# Patient Record
Sex: FEMALE | Race: WHITE | URBAN - METROPOLITAN AREA
[De-identification: names, ages, dates, MRNs, and addresses within clinical notes are randomized per-mention and may not be internally consistent; named-entity substitution may affect disease eponyms.]

---

## 2018-07-11 ENCOUNTER — EMERGENCY (EMERGENCY)
Facility: HOSPITAL | Age: 33
LOS: 0 days | Discharge: HOME | End: 2018-07-11
Attending: EMERGENCY MEDICINE | Admitting: EMERGENCY MEDICINE
Payer: SELF-PAY

## 2018-07-11 VITALS
HEART RATE: 88 BPM | SYSTOLIC BLOOD PRESSURE: 141 MMHG | DIASTOLIC BLOOD PRESSURE: 89 MMHG | OXYGEN SATURATION: 100 % | RESPIRATION RATE: 18 BRPM | TEMPERATURE: 98 F

## 2018-07-11 VITALS — WEIGHT: 199.96 LBS

## 2018-07-11 DIAGNOSIS — M79.7 FIBROMYALGIA: ICD-10-CM

## 2018-07-11 DIAGNOSIS — R10.9 UNSPECIFIED ABDOMINAL PAIN: ICD-10-CM

## 2018-07-11 DIAGNOSIS — J45.909 UNSPECIFIED ASTHMA, UNCOMPLICATED: ICD-10-CM

## 2018-07-11 DIAGNOSIS — R10.12 LEFT UPPER QUADRANT PAIN: ICD-10-CM

## 2018-07-11 DIAGNOSIS — Z90.49 ACQUIRED ABSENCE OF OTHER SPECIFIED PARTS OF DIGESTIVE TRACT: ICD-10-CM

## 2018-07-11 DIAGNOSIS — Z90.49 ACQUIRED ABSENCE OF OTHER SPECIFIED PARTS OF DIGESTIVE TRACT: Chronic | ICD-10-CM

## 2018-07-11 DIAGNOSIS — R07.89 OTHER CHEST PAIN: ICD-10-CM

## 2018-07-11 DIAGNOSIS — R42 DIZZINESS AND GIDDINESS: ICD-10-CM

## 2018-07-11 DIAGNOSIS — M54.9 DORSALGIA, UNSPECIFIED: ICD-10-CM

## 2018-07-11 LAB
ALBUMIN SERPL ELPH-MCNC: 3.6 G/DL — SIGNIFICANT CHANGE UP (ref 3.3–5)
ALP SERPL-CCNC: 145 U/L — HIGH (ref 40–120)
ALT FLD-CCNC: 31 U/L — SIGNIFICANT CHANGE UP (ref 12–78)
ANION GAP SERPL CALC-SCNC: 9 MMOL/L — SIGNIFICANT CHANGE UP (ref 5–17)
APTT BLD: 29.5 SEC — SIGNIFICANT CHANGE UP (ref 27.5–37.4)
AST SERPL-CCNC: 24 U/L — SIGNIFICANT CHANGE UP (ref 15–37)
BASOPHILS # BLD AUTO: 0.01 K/UL — SIGNIFICANT CHANGE UP (ref 0–0.2)
BASOPHILS NFR BLD AUTO: 0.2 % — SIGNIFICANT CHANGE UP (ref 0–2)
BILIRUB SERPL-MCNC: 0.3 MG/DL — SIGNIFICANT CHANGE UP (ref 0.2–1.2)
BUN SERPL-MCNC: 13 MG/DL — SIGNIFICANT CHANGE UP (ref 7–23)
CALCIUM SERPL-MCNC: 9.2 MG/DL — SIGNIFICANT CHANGE UP (ref 8.5–10.1)
CHLORIDE SERPL-SCNC: 113 MMOL/L — HIGH (ref 96–108)
CO2 SERPL-SCNC: 20 MMOL/L — LOW (ref 22–31)
CREAT SERPL-MCNC: 0.6 MG/DL — SIGNIFICANT CHANGE UP (ref 0.5–1.3)
D DIMER BLD IA.RAPID-MCNC: 247 NG/ML DDU — HIGH
EOSINOPHIL # BLD AUTO: 0.1 K/UL — SIGNIFICANT CHANGE UP (ref 0–0.5)
EOSINOPHIL NFR BLD AUTO: 1.5 % — SIGNIFICANT CHANGE UP (ref 0–6)
GLUCOSE SERPL-MCNC: 85 MG/DL — SIGNIFICANT CHANGE UP (ref 70–99)
HCT VFR BLD CALC: 36.2 % — SIGNIFICANT CHANGE UP (ref 34.5–45)
HGB BLD-MCNC: 12.3 G/DL — SIGNIFICANT CHANGE UP (ref 11.5–15.5)
IMM GRANULOCYTES NFR BLD AUTO: 0.2 % — SIGNIFICANT CHANGE UP (ref 0–1.5)
INR BLD: 1.1 RATIO — SIGNIFICANT CHANGE UP (ref 0.88–1.16)
LIDOCAIN IGE QN: 65 U/L — LOW (ref 73–393)
LYMPHOCYTES # BLD AUTO: 1.61 K/UL — SIGNIFICANT CHANGE UP (ref 1–3.3)
LYMPHOCYTES # BLD AUTO: 24.9 % — SIGNIFICANT CHANGE UP (ref 13–44)
MCHC RBC-ENTMCNC: 28 PG — SIGNIFICANT CHANGE UP (ref 27–34)
MCHC RBC-ENTMCNC: 34 GM/DL — SIGNIFICANT CHANGE UP (ref 32–36)
MCV RBC AUTO: 82.5 FL — SIGNIFICANT CHANGE UP (ref 80–100)
MONOCYTES # BLD AUTO: 0.38 K/UL — SIGNIFICANT CHANGE UP (ref 0–0.9)
MONOCYTES NFR BLD AUTO: 5.9 % — SIGNIFICANT CHANGE UP (ref 2–14)
NEUTROPHILS # BLD AUTO: 4.36 K/UL — SIGNIFICANT CHANGE UP (ref 1.8–7.4)
NEUTROPHILS NFR BLD AUTO: 67.3 % — SIGNIFICANT CHANGE UP (ref 43–77)
NRBC # BLD: 0 /100 WBCS — SIGNIFICANT CHANGE UP (ref 0–0)
PLATELET # BLD AUTO: 280 K/UL — SIGNIFICANT CHANGE UP (ref 150–400)
POTASSIUM SERPL-MCNC: 3.8 MMOL/L — SIGNIFICANT CHANGE UP (ref 3.5–5.3)
POTASSIUM SERPL-SCNC: 3.8 MMOL/L — SIGNIFICANT CHANGE UP (ref 3.5–5.3)
PROT SERPL-MCNC: 7.2 GM/DL — SIGNIFICANT CHANGE UP (ref 6–8.3)
PROTHROM AB SERPL-ACNC: 11.9 SEC — SIGNIFICANT CHANGE UP (ref 9.8–12.7)
RBC # BLD: 4.39 M/UL — SIGNIFICANT CHANGE UP (ref 3.8–5.2)
RBC # FLD: 13.1 % — SIGNIFICANT CHANGE UP (ref 10.3–14.5)
SODIUM SERPL-SCNC: 142 MMOL/L — SIGNIFICANT CHANGE UP (ref 135–145)
WBC # BLD: 6.47 K/UL — SIGNIFICANT CHANGE UP (ref 3.8–10.5)
WBC # FLD AUTO: 6.47 K/UL — SIGNIFICANT CHANGE UP (ref 3.8–10.5)

## 2018-07-11 PROCEDURE — 99285 EMERGENCY DEPT VISIT HI MDM: CPT

## 2018-07-11 PROCEDURE — 71260 CT THORAX DX C+: CPT | Mod: 26

## 2018-07-11 RX ORDER — METHOCARBAMOL 500 MG/1
2 TABLET, FILM COATED ORAL
Qty: 18 | Refills: 0
Start: 2018-07-11 | End: 2018-07-13

## 2018-07-11 RX ORDER — OXYCODONE AND ACETAMINOPHEN 5; 325 MG/1; MG/1
1 TABLET ORAL ONCE
Qty: 0 | Refills: 0 | Status: DISCONTINUED | OUTPATIENT
Start: 2018-07-11 | End: 2018-07-11

## 2018-07-11 RX ORDER — SODIUM CHLORIDE 9 MG/ML
1000 INJECTION INTRAMUSCULAR; INTRAVENOUS; SUBCUTANEOUS ONCE
Qty: 0 | Refills: 0 | Status: COMPLETED | OUTPATIENT
Start: 2018-07-11 | End: 2018-07-11

## 2018-07-11 RX ADMIN — SODIUM CHLORIDE 1000 MILLILITER(S): 9 INJECTION INTRAMUSCULAR; INTRAVENOUS; SUBCUTANEOUS at 14:16

## 2018-07-11 RX ADMIN — OXYCODONE AND ACETAMINOPHEN 1 TABLET(S): 5; 325 TABLET ORAL at 16:38

## 2018-07-11 RX ADMIN — OXYCODONE AND ACETAMINOPHEN 1 TABLET(S): 5; 325 TABLET ORAL at 16:55

## 2018-07-11 NOTE — ED STATDOCS - OBJECTIVE STATEMENT
34 y/o female with a PMHx of fibromyalgia, seasonal allergies (on saline and Zyrtec), asthma (on albuterol), s/p cholecystectomy presents to the ED c/o sharp, constant abd pain that radiates to left flank pain for 2-3 days with N/D. Exacerbated when breathing deeply and palpation. +subjective fever. +dizziness. Family h/o blood clots. Denies vomiting, recent travel.  Pt seen at [facility] and sent to ED for CT scan to r/o PE. 34 y/o female with a PMHx of fibromyalgia, seasonal allergies (on saline and Zyrtec), asthma (on albuterol), s/p cholecystectomy presents to the ED c/o sharp, constant abd pain that radiates to left flank pain for 2-3 days with N/D. Exacerbated when breathing deeply and palpation. +subjective fever. +dizziness. +back pain. Family h/o blood clots. Denies vomiting, recent travel.  Pt seen at [facility] and sent to ED for CT scan to r/o PE.

## 2018-07-11 NOTE — ED ADULT TRIAGE NOTE - CHIEF COMPLAINT QUOTE
pt c/o abdominal and left falnk pain for  3 days with nausea and diarrhea, no vomitting, slight fever per pt. pt seen at [facility] and sent to ED for CT scan.

## 2018-07-11 NOTE — ED STATDOCS - PROGRESS NOTE DETAILS
32 yo F with PMHx fibromyalgia, asthma sent to ED from urgent care c/o sharp constant L sided chest and abdominal pain with radiation to back, worse when taking deep breaths and laying down x2-3 days associated with nausea, diarrhea, and occasional dizziness.  Denies calf pain, recent travel.    PE: + TTP LLQ, LUQ, L chest wall, lungs CTA b/l, heart RRR s1/s2, no calf tenderness b/l no LE edema  Plan: labs, d-dimer, CT A/P  Tiny Stewart PA-C 32 yo F with PMHx fibromyalgia, asthma sent to ED from urgent care c/o sharp constant L sided chest and abdominal pain with radiation to back, worse when taking deep breaths and laying down x2-3 days associated with nausea, diarrhea, and occasional dizziness.  Denies calf pain, recent travel.    PE: + TTP LLQ, LUQ, L chest wall, lungs CTA b/l, heart RRR s1/s2, no calf tenderness b/l no LE edema  Plan: labs, d-dimer, CT chest to r/o PE  Tiny Stewart PA-C CT chest negative for PE, likely pain related to pt's fibromyalgia, pt has appt with rheumatologist tomorrow.  Plan to d/c home with outpt f/u tmrw.  Pt agreeable to d/c and plan of care, all questions answered, return precautions given  Tiny Stewart PA-C

## 2018-07-11 NOTE — ED STATDOCS - GASTROINTESTINAL, MLM
abdomen soft, and non-distended. Bowel sounds present. +TTP LUQ and LLQ of abd and left chest wall abdomen soft, and non-distended. Bowel sounds present. +TTP LUQ and LLQ of abd

## 2018-07-11 NOTE — ED ADULT NURSE NOTE - CHIEF COMPLAINT QUOTE
pt c/o abdominal and left falnk pain for  3 days with nausea and diarrhea, no vomitting, slight fever per pt. pt seen at [facility] and sent to ED for CT scan.

## 2018-07-11 NOTE — ED STATDOCS - ATTENDING CONTRIBUTION TO CARE
I, John Brambila MD,  performed the initial face to face bedside interview with this patient regarding history of present illness, review of symptoms and relevant past medical, social and family history.  I completed an independent physical examination.  I was the initial provider who evaluated this patient. I have signed out the follow up of any pending tests (i.e. labs, radiological studies) to the ACP.  I have communicated the patient’s plan of care and disposition with the ACP.

## 2018-07-11 NOTE — ED STATDOCS - MUSCULOSKELETAL, MLM
range of motion is not limited and there is no muscle tenderness. range of motion is not limited. +TTP left chest wall

## 2018-07-11 NOTE — ED STATDOCS - CHPI ED SYMPTOM POS
NAUSEA/PAIN/+left flank pain, +dizziness/FEVER/DIARRHEA FEVER/NAUSEA/+left flank pain, +dizziness, +back pain/DIARRHEA/PAIN

## 2018-07-11 NOTE — ED STATDOCS - NS_ ATTENDINGSCRIBEDETAILS _ED_A_ED_FT
I, John Brambila MD,  performed the initial face to face bedside interview with this patient regarding history of present illness, review of symptoms and relevant past medical, social and family history.  I completed an independent physical examination.    The history, relevant review of systems, past medical and surgical history, medical decision making, and physical examination was documented by the scribe in my presence and I attest to the accuracy of the documentation.

## 2020-01-14 NOTE — ED ADULT NURSE NOTE - INTEGUMENTARY WDL
Addended by: JESUS WHITEHEAD on: 1/14/2020 08:03 AM     Modules accepted: Orders    
Color consistent with ethnicity/race, warm, dry intact, resilient.

## 2023-06-28 ENCOUNTER — EMERGENCY (EMERGENCY)
Facility: HOSPITAL | Age: 38
LOS: 0 days | Discharge: HOME | End: 2023-06-28
Attending: EMERGENCY MEDICINE
Payer: MEDICAID

## 2023-06-28 VITALS — HEIGHT: 64 IN | WEIGHT: 203.93 LBS

## 2023-06-28 VITALS
TEMPERATURE: 98 F | HEART RATE: 97 BPM | SYSTOLIC BLOOD PRESSURE: 120 MMHG | DIASTOLIC BLOOD PRESSURE: 60 MMHG | RESPIRATION RATE: 19 BRPM | OXYGEN SATURATION: 99 %

## 2023-06-28 DIAGNOSIS — N83.202 UNSPECIFIED OVARIAN CYST, LEFT SIDE: ICD-10-CM

## 2023-06-28 DIAGNOSIS — Z91.013 ALLERGY TO SEAFOOD: ICD-10-CM

## 2023-06-28 DIAGNOSIS — R68.2 DRY MOUTH, UNSPECIFIED: ICD-10-CM

## 2023-06-28 DIAGNOSIS — Z90.49 ACQUIRED ABSENCE OF OTHER SPECIFIED PARTS OF DIGESTIVE TRACT: Chronic | ICD-10-CM

## 2023-06-28 DIAGNOSIS — R11.2 NAUSEA WITH VOMITING, UNSPECIFIED: ICD-10-CM

## 2023-06-28 DIAGNOSIS — R11.15 CYCLICAL VOMITING SYNDROME UNRELATED TO MIGRAINE: ICD-10-CM

## 2023-06-28 DIAGNOSIS — Z88.0 ALLERGY STATUS TO PENICILLIN: ICD-10-CM

## 2023-06-28 DIAGNOSIS — Z91.041 RADIOGRAPHIC DYE ALLERGY STATUS: ICD-10-CM

## 2023-06-28 DIAGNOSIS — N83.201 UNSPECIFIED OVARIAN CYST, RIGHT SIDE: ICD-10-CM

## 2023-06-28 DIAGNOSIS — R63.4 ABNORMAL WEIGHT LOSS: ICD-10-CM

## 2023-06-28 PROBLEM — M79.7 FIBROMYALGIA: Chronic | Status: ACTIVE | Noted: 2018-07-22

## 2023-06-28 PROBLEM — J45.909 UNSPECIFIED ASTHMA, UNCOMPLICATED: Chronic | Status: ACTIVE | Noted: 2018-07-22

## 2023-06-28 LAB
ALBUMIN SERPL ELPH-MCNC: 3.4 G/DL — SIGNIFICANT CHANGE UP (ref 3.3–5)
ALP SERPL-CCNC: 124 U/L — HIGH (ref 40–120)
ALT FLD-CCNC: 50 U/L — SIGNIFICANT CHANGE UP (ref 12–78)
ANION GAP SERPL CALC-SCNC: 9 MMOL/L — SIGNIFICANT CHANGE UP (ref 5–17)
AST SERPL-CCNC: 55 U/L — HIGH (ref 15–37)
BASOPHILS # BLD AUTO: 0.02 K/UL — SIGNIFICANT CHANGE UP (ref 0–0.2)
BASOPHILS NFR BLD AUTO: 0.3 % — SIGNIFICANT CHANGE UP (ref 0–2)
BILIRUB SERPL-MCNC: 1 MG/DL — SIGNIFICANT CHANGE UP (ref 0.2–1.2)
BUN SERPL-MCNC: 9 MG/DL — SIGNIFICANT CHANGE UP (ref 7–23)
CALCIUM SERPL-MCNC: 9.5 MG/DL — SIGNIFICANT CHANGE UP (ref 8.5–10.1)
CHLORIDE SERPL-SCNC: 108 MMOL/L — SIGNIFICANT CHANGE UP (ref 96–108)
CO2 SERPL-SCNC: 24 MMOL/L — SIGNIFICANT CHANGE UP (ref 22–31)
CREAT SERPL-MCNC: 0.36 MG/DL — LOW (ref 0.5–1.3)
EGFR: 135 ML/MIN/1.73M2 — SIGNIFICANT CHANGE UP
EOSINOPHIL # BLD AUTO: 0.02 K/UL — SIGNIFICANT CHANGE UP (ref 0–0.5)
EOSINOPHIL NFR BLD AUTO: 0.3 % — SIGNIFICANT CHANGE UP (ref 0–6)
GLUCOSE SERPL-MCNC: 81 MG/DL — SIGNIFICANT CHANGE UP (ref 70–99)
HCT VFR BLD CALC: 38.3 % — SIGNIFICANT CHANGE UP (ref 34.5–45)
HGB BLD-MCNC: 13.1 G/DL — SIGNIFICANT CHANGE UP (ref 11.5–15.5)
IMM GRANULOCYTES NFR BLD AUTO: 0 % — SIGNIFICANT CHANGE UP (ref 0–0.9)
LACTATE SERPL-SCNC: 1.5 MMOL/L — SIGNIFICANT CHANGE UP (ref 0.7–2)
LIDOCAIN IGE QN: 125 U/L — SIGNIFICANT CHANGE UP (ref 73–393)
LYMPHOCYTES # BLD AUTO: 1.48 K/UL — SIGNIFICANT CHANGE UP (ref 1–3.3)
LYMPHOCYTES # BLD AUTO: 25.4 % — SIGNIFICANT CHANGE UP (ref 13–44)
MCHC RBC-ENTMCNC: 27.2 PG — SIGNIFICANT CHANGE UP (ref 27–34)
MCHC RBC-ENTMCNC: 34.2 GM/DL — SIGNIFICANT CHANGE UP (ref 32–36)
MCV RBC AUTO: 79.6 FL — LOW (ref 80–100)
MONOCYTES # BLD AUTO: 0.45 K/UL — SIGNIFICANT CHANGE UP (ref 0–0.9)
MONOCYTES NFR BLD AUTO: 7.7 % — SIGNIFICANT CHANGE UP (ref 2–14)
NEUTROPHILS # BLD AUTO: 3.85 K/UL — SIGNIFICANT CHANGE UP (ref 1.8–7.4)
NEUTROPHILS NFR BLD AUTO: 66.3 % — SIGNIFICANT CHANGE UP (ref 43–77)
PLATELET # BLD AUTO: 209 K/UL — SIGNIFICANT CHANGE UP (ref 150–400)
POTASSIUM SERPL-MCNC: 4.1 MMOL/L — SIGNIFICANT CHANGE UP (ref 3.5–5.3)
POTASSIUM SERPL-SCNC: 4.1 MMOL/L — SIGNIFICANT CHANGE UP (ref 3.5–5.3)
PROT SERPL-MCNC: 7.1 GM/DL — SIGNIFICANT CHANGE UP (ref 6–8.3)
RBC # BLD: 4.81 M/UL — SIGNIFICANT CHANGE UP (ref 3.8–5.2)
RBC # FLD: 13.4 % — SIGNIFICANT CHANGE UP (ref 10.3–14.5)
SODIUM SERPL-SCNC: 141 MMOL/L — SIGNIFICANT CHANGE UP (ref 135–145)
WBC # BLD: 5.82 K/UL — SIGNIFICANT CHANGE UP (ref 3.8–10.5)
WBC # FLD AUTO: 5.82 K/UL — SIGNIFICANT CHANGE UP (ref 3.8–10.5)

## 2023-06-28 PROCEDURE — 36415 COLL VENOUS BLD VENIPUNCTURE: CPT

## 2023-06-28 PROCEDURE — 74177 CT ABD & PELVIS W/CONTRAST: CPT | Mod: 26,MA

## 2023-06-28 PROCEDURE — 80053 COMPREHEN METABOLIC PANEL: CPT

## 2023-06-28 PROCEDURE — 96374 THER/PROPH/DIAG INJ IV PUSH: CPT | Mod: XU

## 2023-06-28 PROCEDURE — 99284 EMERGENCY DEPT VISIT MOD MDM: CPT | Mod: 25

## 2023-06-28 PROCEDURE — 74018 RADEX ABDOMEN 1 VIEW: CPT | Mod: 26

## 2023-06-28 PROCEDURE — 74177 CT ABD & PELVIS W/CONTRAST: CPT | Mod: MA

## 2023-06-28 PROCEDURE — 83605 ASSAY OF LACTIC ACID: CPT

## 2023-06-28 PROCEDURE — 74018 RADEX ABDOMEN 1 VIEW: CPT

## 2023-06-28 PROCEDURE — 85025 COMPLETE CBC W/AUTO DIFF WBC: CPT

## 2023-06-28 PROCEDURE — 83690 ASSAY OF LIPASE: CPT

## 2023-06-28 PROCEDURE — 99285 EMERGENCY DEPT VISIT HI MDM: CPT

## 2023-06-28 RX ORDER — ONDANSETRON 8 MG/1
4 TABLET, FILM COATED ORAL ONCE
Refills: 0 | Status: COMPLETED | OUTPATIENT
Start: 2023-06-28 | End: 2023-06-28

## 2023-06-28 RX ORDER — SODIUM CHLORIDE 9 MG/ML
2900 INJECTION INTRAMUSCULAR; INTRAVENOUS; SUBCUTANEOUS ONCE
Refills: 0 | Status: COMPLETED | OUTPATIENT
Start: 2023-06-28 | End: 2023-06-28

## 2023-06-28 RX ADMIN — ONDANSETRON 4 MILLIGRAM(S): 8 TABLET, FILM COATED ORAL at 13:43

## 2023-06-28 RX ADMIN — SODIUM CHLORIDE 2900 MILLILITER(S): 9 INJECTION INTRAMUSCULAR; INTRAVENOUS; SUBCUTANEOUS at 13:43

## 2023-06-28 NOTE — ED STATDOCS - OBJECTIVE STATEMENT
39 y/o female p/w vomiting x1 month. Pt states the vomiting has become more frequent the last week or so, not associated with eating, doesn't always feel nauseous, denies diarrhea, fevers. Pt had an 80 lb weight loss the last seven months. Allergic to shellfish and vomits with penicillin. No urinary symptoms.

## 2023-06-28 NOTE — ED STATDOCS - NSFOLLOWUPINSTRUCTIONS_ED_ALL_ED_FT
Follow up with your pmd within 48 hours- show copies of ER results   Follow up with gastroenterologist for further testing   Return to the ED if any worsening or persistent symptoms       Nausea and Vomiting, Adult  Nausea is the feeling that you have an upset stomach or that you are about to vomit. As nausea gets worse, it can lead to vomiting. Vomiting is when stomach contents forcefully come out of your mouth as a result of nausea. Vomiting can make you feel weak and cause you to become dehydrated.    Dehydration can make you feel tired and thirsty, cause you to have a dry mouth, and decrease how often you urinate. Older adults and people with other diseases or a weak disease-fighting system (immune system) are at higher risk for dehydration. It is important to treat your nausea and vomiting as told by your health care provider.    Follow these instructions at home:  Watch your symptoms for any changes. Tell your health care provider about them.    Eating and drinking    A bottle of clear fruit juice and glass of water.  A sign showing that a person should not drink alcohol.  Take an oral rehydration solution (ORS). This is a drink that is sold at pharmacies and retail stores.  Drink clear fluids slowly and in small amounts as you are able. Clear fluids include water, ice chips, low-calorie sports drinks, and fruit juice that has water added (diluted fruit juice).  Eat bland, easy-to-digest foods in small amounts as you are able. These foods include bananas, applesauce, rice, lean meats, toast, and crackers.  Avoid fluids that contain a lot of sugar or caffeine, such as energy drinks, sports drinks, and soda.  Avoid alcohol.  Avoid spicy or fatty foods.  General instructions    Take over-the-counter and prescription medicines only as told by your health care provider.  Drink enough fluid to keep your urine pale yellow.  Wash your hands often using soap and water for at least 20 seconds. If soap and water are not available, use hand .  Make sure that everyone in your household washes their hands well and often.  Rest at home while you recover.  Watch your condition for any changes.  Take slow and deep breaths when you feel nauseous.  Keep all follow-up visits. This is important.  Contact a health care provider if:  Your symptoms get worse.  You have new symptoms.  You have a fever.  You cannot drink fluids without vomiting.  Your nausea does not go away after 2 days.  You feel light-headed or dizzy.  You have a headache.  You have muscle cramps.  You have a rash.  You have pain while urinating.  Get help right away if:  You have pain in your chest, neck, arm, or jaw.  You feel extremely weak or you faint.  You have persistent vomiting.  You have vomit that is bright red or looks like black coffee grounds.  You have bloody or black stools (feces) or stools that look like tar.  You have a severe headache, a stiff neck, or both.  You have severe pain, cramping, or bloating in your abdomen.  You have difficulty breathing, or you are breathing very quickly.  Your heart is beating very quickly.  Your skin feels cold and clammy.  You feel confused.  You have signs of dehydration, such as:  Dark urine, very little urine, or no urine.  Cracked lips.  Dry mouth.  Sunken eyes.  Sleepiness.  Weakness.  These symptoms may be an emergency. Get help right away. Call 911.  Do not wait to see if the symptoms will go away.  Do not drive yourself to the hospital.  Summary  Nausea is the feeling that you have an upset stomach or that you are about to vomit. As nausea gets worse, it can lead to vomiting. Vomiting can make you feel weak and cause you to become dehydrated.  Follow instructions from your health care provider about eating and drinking to prevent dehydration.  Take over-the-counter and prescription medicines only as told by your health care provider.  Contact your health care provider if your symptoms get worse, or you have new symptoms.  Keep all follow-up visits. This is important.  This information is not intended to replace advice given to you by your health care provider. Make sure you discuss any questions you have with your health care provider.    Document Revised: 06/24/2022 Document Reviewed: 06/24/2022

## 2023-06-28 NOTE — ED ADULT TRIAGE NOTE - CHIEF COMPLAINT QUOTE
lower back pain, vomiting and nausea x 1 month. Pt was seen by PCP and sent in for further evaluation.

## 2023-06-28 NOTE — ED ADULT NURSE NOTE - NSFALLOOBATTEMPT_ED_ALL_ED
PATIENT D/C AT THIS TIME. PATIENT VERBALIZES UNDERSTANDING OF D/C
INSTRUCTIONS. PICC LINE REMOVED BIO-PATCH PLACE AND TEGADERM AND PATIENT
ADVISED TO LEAVE ON UNTIL 3/18/21 THEN REMOVE AND CLEAN WITH SOAP AND WATER.
TELE REMOVED AND ED NOTIFIED AT THIS TIME. No

## 2023-06-28 NOTE — ED STATDOCS - CLINICAL SUMMARY MEDICAL DECISION MAKING FREE TEXT BOX
37 y/o female w/ weight loss over the last 7 months and now w/ cyclic vomiting for a month. Unsure of etiology, will check labs, urine, possible CT scan. Pt w/ anaphylactic reaction to shellfish, will discuss w/ radiology whether IV is indicated.

## 2023-06-28 NOTE — ED STATDOCS - PATIENT PORTAL LINK FT
You can access the FollowMyHealth Patient Portal offered by [facility] by registering at the following website: [email or web address]. By joining [facility]’s FollowMyHealth portal, you will also be able to view your health information using other applications (apps) compatible with our system.

## 2023-06-28 NOTE — ED STATDOCS - PROGRESS NOTE DETAILS
ALICE Pascual: labs, ct reviewed. pt feeling better. will finish IVF and then po challenge. stacey Pascual: I participated in the care of this patient. I agree with the history, physical and plan. ALICE Pascual: IVF finished, pt feeling better. Pt refusing UA. Pt tolerated po. pt stable for dc and to follow up with GI outpt.

## 2023-06-28 NOTE — ED STATDOCS - ATTENDING APP SHARED VISIT CONTRIBUTION OF CARE
I personally saw the patient with the LISA, and completed the key components of the history and physical exam. I then discussed the management plan with the LISA.

## 2023-06-28 NOTE — ED ADULT NURSE NOTE - NSFALLUNIVINTERV_ED_ALL_ED
Bed/Stretcher in lowest position, wheels locked, appropriate side rails in place/Call bell, personal items and telephone in reach/Instruct patient to call for assistance before getting out of bed/chair/stretcher/Non-slip footwear applied when patient is off stretcher/[place] to call system/Physically safe environment - no spills, clutter or unnecessary equipment/Purposeful proactive rounding/Room/bathroom lighting operational, light cord in reach

## 2023-07-14 ENCOUNTER — APPOINTMENT (OUTPATIENT)
Dept: GASTROENTEROLOGY | Facility: CLINIC | Age: 38
End: 2023-07-14
Payer: MEDICAID

## 2023-07-14 VITALS
HEIGHT: 64 IN | BODY MASS INDEX: 34.31 KG/M2 | WEIGHT: 201 LBS | HEART RATE: 128 BPM | DIASTOLIC BLOOD PRESSURE: 94 MMHG | SYSTOLIC BLOOD PRESSURE: 125 MMHG

## 2023-07-14 PROCEDURE — 99203 OFFICE O/P NEW LOW 30 MIN: CPT

## 2023-07-14 RX ORDER — ONDANSETRON HYDROCHLORIDE 4 MG/1
4 TABLET, FILM COATED ORAL
Refills: 0 | Status: ACTIVE | COMMUNITY

## 2023-07-14 NOTE — PHYSICAL EXAM

## 2023-07-14 NOTE — ASSESSMENT
[FreeTextEntry1] : 36 yo female with history of nausea and vomiting.  Etiology is not clear.  Plan to obtain upper GI series to look for evidence of gastric emptying issues.  We will change medication to pantoprazole 40 mg daily.  Patient to use as needed Zofran.  Patient advised to use small frequent meals.  Follow up three weeks.

## 2023-07-14 NOTE — HISTORY OF PRESENT ILLNESS
[FreeTextEntry1] : Ms. ETIENNE NEWTON is a 38 year old female with history of GERD symptoms in the past with new onset of nausea and vomiting for the last month.  Patient has noted some weight loss associated.  Patient does note increasing GERD symptoms despite taking omeprazole.  Patient went to the emergency room for evaluation where laboratory tests and a CAT scan were essentially unremarkable.  Patient was noted to have bilateral ovarian cysts which have been longstanding.  Patient has been taking Zofran with some improvement.\par

## 2023-07-19 ENCOUNTER — OUTPATIENT (OUTPATIENT)
Dept: OUTPATIENT SERVICES | Facility: HOSPITAL | Age: 38
LOS: 1 days | End: 2023-07-19
Payer: MEDICAID

## 2023-07-19 ENCOUNTER — RESULT REVIEW (OUTPATIENT)
Age: 38
End: 2023-07-19

## 2023-07-19 DIAGNOSIS — Z90.49 ACQUIRED ABSENCE OF OTHER SPECIFIED PARTS OF DIGESTIVE TRACT: Chronic | ICD-10-CM

## 2023-07-19 DIAGNOSIS — R11.10 VOMITING, UNSPECIFIED: ICD-10-CM

## 2023-07-19 PROCEDURE — 74240 X-RAY XM UPR GI TRC 1CNTRST: CPT | Mod: 26

## 2023-07-19 PROCEDURE — 74240 X-RAY XM UPR GI TRC 1CNTRST: CPT

## 2023-07-20 DIAGNOSIS — R11.10 VOMITING, UNSPECIFIED: ICD-10-CM

## 2023-07-25 RX ORDER — ONDANSETRON 4 MG/1
4 TABLET, ORALLY DISINTEGRATING ORAL
Qty: 90 | Refills: 12 | Status: DISCONTINUED | COMMUNITY
Start: 2023-07-14 | End: 2023-07-25

## 2023-08-01 ENCOUNTER — APPOINTMENT (OUTPATIENT)
Dept: GASTROENTEROLOGY | Facility: CLINIC | Age: 38
End: 2023-08-01
Payer: MEDICAID

## 2023-08-01 VITALS — WEIGHT: 200 LBS | HEIGHT: 64 IN | BODY MASS INDEX: 34.15 KG/M2

## 2023-08-01 DIAGNOSIS — Z09 ENCOUNTER FOR FOLLOW-UP EXAMINATION AFTER COMPLETED TREATMENT FOR CONDITIONS OTHER THAN MALIGNANT NEOPLASM: ICD-10-CM

## 2023-08-01 DIAGNOSIS — R13.10 DYSPHAGIA, UNSPECIFIED: ICD-10-CM

## 2023-08-01 DIAGNOSIS — K30 FUNCTIONAL DYSPEPSIA: ICD-10-CM

## 2023-08-01 DIAGNOSIS — R11.10 VOMITING, UNSPECIFIED: ICD-10-CM

## 2023-08-01 PROCEDURE — 99213 OFFICE O/P EST LOW 20 MIN: CPT

## 2023-08-01 NOTE — HISTORY OF PRESENT ILLNESS
[FreeTextEntry1] : Ms. ETIENNE NEWTON is a 38 year old female with history of nausea and vomiting. Patient feels significantly improved on pantoprazole. Unable to tolerate ODT - caused vomiting. Upper GI series - normal esophagus, but mild delay in gastric emptying. Patient scheduled for gastric emptying scan. Patient notes new onset of dysphagia for solids.

## 2023-08-01 NOTE — ASSESSMENT
[FreeTextEntry1] : 37 yo female with history of nausea and new onset of dysphagia. Patient to proceed with gastric emptying and arrange upper endoscopy for new onset of dysphagia.

## 2023-11-12 ENCOUNTER — NON-APPOINTMENT (OUTPATIENT)
Age: 38
End: 2023-11-12

## 2023-11-17 ENCOUNTER — APPOINTMENT (OUTPATIENT)
Dept: OTOLARYNGOLOGY | Facility: CLINIC | Age: 38
End: 2023-11-17
Payer: MEDICAID

## 2023-11-17 VITALS
HEIGHT: 64 IN | HEART RATE: 96 BPM | DIASTOLIC BLOOD PRESSURE: 88 MMHG | BODY MASS INDEX: 34.15 KG/M2 | WEIGHT: 200 LBS | SYSTOLIC BLOOD PRESSURE: 168 MMHG

## 2023-11-17 DIAGNOSIS — R26.89 OTHER ABNORMALITIES OF GAIT AND MOBILITY: ICD-10-CM

## 2023-11-17 DIAGNOSIS — J32.2 CHRONIC ETHMOIDAL SINUSITIS: ICD-10-CM

## 2023-11-17 DIAGNOSIS — R42 DIZZINESS AND GIDDINESS: ICD-10-CM

## 2023-11-17 DIAGNOSIS — H69.93 UNSPECIFIED EUSTACHIAN TUBE DISORDER, BILATERAL: ICD-10-CM

## 2023-11-17 DIAGNOSIS — Z83.3 FAMILY HISTORY OF DIABETES MELLITUS: ICD-10-CM

## 2023-11-17 DIAGNOSIS — H90.3 SENSORINEURAL HEARING LOSS, BILATERAL: ICD-10-CM

## 2023-11-17 DIAGNOSIS — Z80.9 FAMILY HISTORY OF MALIGNANT NEOPLASM, UNSPECIFIED: ICD-10-CM

## 2023-11-17 PROCEDURE — 31231 NASAL ENDOSCOPY DX: CPT

## 2023-11-17 PROCEDURE — 99204 OFFICE O/P NEW MOD 45 MIN: CPT | Mod: 25

## 2023-11-17 PROCEDURE — 92567 TYMPANOMETRY: CPT

## 2023-11-17 PROCEDURE — 92557 COMPREHENSIVE HEARING TEST: CPT

## 2023-11-17 RX ORDER — MECLIZINE HYDROCHLORIDE 25 MG/1
25 TABLET ORAL
Qty: 40 | Refills: 2 | Status: ACTIVE | COMMUNITY
Start: 2023-11-17 | End: 1900-01-01

## 2023-11-21 ENCOUNTER — APPOINTMENT (OUTPATIENT)
Dept: GASTROENTEROLOGY | Facility: AMBULATORY MEDICAL SERVICES | Age: 38
End: 2023-11-21

## 2023-12-19 ENCOUNTER — APPOINTMENT (OUTPATIENT)
Dept: GASTROENTEROLOGY | Facility: CLINIC | Age: 38
End: 2023-12-19

## 2024-01-23 RX ORDER — ONDANSETRON 4 MG/1
4 TABLET ORAL 3 TIMES DAILY
Qty: 90 | Refills: 0 | Status: ACTIVE | COMMUNITY
Start: 2023-07-18 | End: 1900-01-01

## 2024-02-13 ENCOUNTER — APPOINTMENT (OUTPATIENT)
Dept: GASTROENTEROLOGY | Facility: CLINIC | Age: 39
End: 2024-02-13

## 2024-03-01 ENCOUNTER — INPATIENT (INPATIENT)
Facility: HOSPITAL | Age: 39
LOS: 3 days | Discharge: HOME HEALTH | DRG: 424 | End: 2024-03-05
Attending: INTERNAL MEDICINE | Admitting: INTERNAL MEDICINE
Payer: MEDICAID

## 2024-03-01 VITALS
DIASTOLIC BLOOD PRESSURE: 99 MMHG | RESPIRATION RATE: 18 BRPM | OXYGEN SATURATION: 95 % | HEIGHT: 64 IN | HEART RATE: 129 BPM | SYSTOLIC BLOOD PRESSURE: 135 MMHG | WEIGHT: 199.96 LBS | TEMPERATURE: 98 F

## 2024-03-01 DIAGNOSIS — E05.90 THYROTOXICOSIS, UNSPECIFIED WITHOUT THYROTOXIC CRISIS OR STORM: ICD-10-CM

## 2024-03-01 DIAGNOSIS — Z88.1 ALLERGY STATUS TO OTHER ANTIBIOTIC AGENTS STATUS: ICD-10-CM

## 2024-03-01 DIAGNOSIS — Z90.49 ACQUIRED ABSENCE OF OTHER SPECIFIED PARTS OF DIGESTIVE TRACT: ICD-10-CM

## 2024-03-01 DIAGNOSIS — K29.70 GASTRITIS, UNSPECIFIED, WITHOUT BLEEDING: ICD-10-CM

## 2024-03-01 DIAGNOSIS — Z90.49 ACQUIRED ABSENCE OF OTHER SPECIFIED PARTS OF DIGESTIVE TRACT: Chronic | ICD-10-CM

## 2024-03-01 DIAGNOSIS — K21.9 GASTRO-ESOPHAGEAL REFLUX DISEASE WITHOUT ESOPHAGITIS: ICD-10-CM

## 2024-03-01 LAB
ACETONE SERPL-MCNC: ABNORMAL
ADD ON TEST-SPECIMEN IN LAB: SIGNIFICANT CHANGE UP
ALBUMIN SERPL ELPH-MCNC: 4.1 G/DL — SIGNIFICANT CHANGE UP (ref 3.3–5)
ALP SERPL-CCNC: 153 U/L — HIGH (ref 40–120)
ALT FLD-CCNC: 30 U/L — SIGNIFICANT CHANGE UP (ref 12–78)
ANION GAP SERPL CALC-SCNC: 11 MMOL/L — SIGNIFICANT CHANGE UP (ref 5–17)
APPEARANCE UR: CLEAR — SIGNIFICANT CHANGE UP
APTT BLD: 29.3 SEC — SIGNIFICANT CHANGE UP (ref 24.5–35.6)
AST SERPL-CCNC: 16 U/L — SIGNIFICANT CHANGE UP (ref 15–37)
BACTERIA # UR AUTO: NEGATIVE /HPF — SIGNIFICANT CHANGE UP
BASE EXCESS BLDV CALC-SCNC: -7.3 MMOL/L — LOW (ref -2–3)
BASOPHILS # BLD AUTO: 0.03 K/UL — SIGNIFICANT CHANGE UP (ref 0–0.2)
BASOPHILS NFR BLD AUTO: 0.2 % — SIGNIFICANT CHANGE UP (ref 0–2)
BILIRUB SERPL-MCNC: 0.9 MG/DL — SIGNIFICANT CHANGE UP (ref 0.2–1.2)
BILIRUB UR-MCNC: NEGATIVE — SIGNIFICANT CHANGE UP
BLD GP AB SCN SERPL QL: SIGNIFICANT CHANGE UP
BUN SERPL-MCNC: 9 MG/DL — SIGNIFICANT CHANGE UP (ref 7–23)
CALCIUM SERPL-MCNC: 9.9 MG/DL — SIGNIFICANT CHANGE UP (ref 8.5–10.1)
CAST: 0 /LPF — SIGNIFICANT CHANGE UP (ref 0–4)
CHLORIDE SERPL-SCNC: 101 MMOL/L — SIGNIFICANT CHANGE UP (ref 96–108)
CO2 SERPL-SCNC: 23 MMOL/L — SIGNIFICANT CHANGE UP (ref 22–31)
COLOR SPEC: YELLOW — SIGNIFICANT CHANGE UP
CREAT SERPL-MCNC: 0.73 MG/DL — SIGNIFICANT CHANGE UP (ref 0.5–1.3)
DIFF PNL FLD: ABNORMAL
EGFR: 109 ML/MIN/1.73M2 — SIGNIFICANT CHANGE UP
EOSINOPHIL # BLD AUTO: 0 K/UL — SIGNIFICANT CHANGE UP (ref 0–0.5)
EOSINOPHIL NFR BLD AUTO: 0 % — SIGNIFICANT CHANGE UP (ref 0–6)
FLUAV AG NPH QL: SIGNIFICANT CHANGE UP
FLUBV AG NPH QL: SIGNIFICANT CHANGE UP
GLUCOSE BLDC GLUCOMTR-MCNC: 271 MG/DL — HIGH (ref 70–99)
GLUCOSE SERPL-MCNC: 375 MG/DL — HIGH (ref 70–99)
GLUCOSE UR QL: >=1000 MG/DL
HCG SERPL-ACNC: 2 MIU/ML — SIGNIFICANT CHANGE UP
HCO3 BLDV-SCNC: 18 MMOL/L — LOW (ref 22–29)
HCT VFR BLD CALC: 48.8 % — HIGH (ref 34.5–45)
HGB BLD-MCNC: 17.1 G/DL — HIGH (ref 11.5–15.5)
IMM GRANULOCYTES NFR BLD AUTO: 0.2 % — SIGNIFICANT CHANGE UP (ref 0–0.9)
INR BLD: 0.95 RATIO — SIGNIFICANT CHANGE UP (ref 0.85–1.18)
KETONES UR-MCNC: >=160 MG/DL
LACTATE SERPL-SCNC: 1.6 MMOL/L — SIGNIFICANT CHANGE UP (ref 0.7–2)
LACTATE SERPL-SCNC: 3.2 MMOL/L — HIGH (ref 0.7–2)
LEUKOCYTE ESTERASE UR-ACNC: NEGATIVE — SIGNIFICANT CHANGE UP
LIDOCAIN IGE QN: 18 U/L — SIGNIFICANT CHANGE UP (ref 13–75)
LYMPHOCYTES # BLD AUTO: 1.68 K/UL — SIGNIFICANT CHANGE UP (ref 1–3.3)
LYMPHOCYTES # BLD AUTO: 12.7 % — LOW (ref 13–44)
MCHC RBC-ENTMCNC: 30.5 PG — SIGNIFICANT CHANGE UP (ref 27–34)
MCHC RBC-ENTMCNC: 35 GM/DL — SIGNIFICANT CHANGE UP (ref 32–36)
MCV RBC AUTO: 87 FL — SIGNIFICANT CHANGE UP (ref 80–100)
MONOCYTES # BLD AUTO: 0.6 K/UL — SIGNIFICANT CHANGE UP (ref 0–0.9)
MONOCYTES NFR BLD AUTO: 4.5 % — SIGNIFICANT CHANGE UP (ref 2–14)
NEUTROPHILS # BLD AUTO: 10.89 K/UL — HIGH (ref 1.8–7.4)
NEUTROPHILS NFR BLD AUTO: 82.4 % — HIGH (ref 43–77)
NITRITE UR-MCNC: NEGATIVE — SIGNIFICANT CHANGE UP
PCO2 BLDV: 35 MMHG — LOW (ref 39–42)
PH BLDV: 7.32 — SIGNIFICANT CHANGE UP (ref 7.32–7.43)
PH UR: 5.5 — SIGNIFICANT CHANGE UP (ref 5–8)
PLATELET # BLD AUTO: 303 K/UL — SIGNIFICANT CHANGE UP (ref 150–400)
PO2 BLDV: 135 MMHG — HIGH (ref 25–45)
POTASSIUM SERPL-MCNC: 4.2 MMOL/L — SIGNIFICANT CHANGE UP (ref 3.5–5.3)
POTASSIUM SERPL-SCNC: 4.2 MMOL/L — SIGNIFICANT CHANGE UP (ref 3.5–5.3)
PROT SERPL-MCNC: 8.3 GM/DL — SIGNIFICANT CHANGE UP (ref 6–8.3)
PROT UR-MCNC: 30 MG/DL
PROTHROM AB SERPL-ACNC: 10.7 SEC — SIGNIFICANT CHANGE UP (ref 9.5–13)
RBC # BLD: 5.61 M/UL — HIGH (ref 3.8–5.2)
RBC # FLD: 11.8 % — SIGNIFICANT CHANGE UP (ref 10.3–14.5)
RBC CASTS # UR COMP ASSIST: 3 /HPF — SIGNIFICANT CHANGE UP (ref 0–4)
RSV RNA NPH QL NAA+NON-PROBE: SIGNIFICANT CHANGE UP
SAO2 % BLDV: 100 % — HIGH (ref 67–88)
SARS-COV-2 RNA SPEC QL NAA+PROBE: SIGNIFICANT CHANGE UP
SODIUM SERPL-SCNC: 135 MMOL/L — SIGNIFICANT CHANGE UP (ref 135–145)
SP GR SPEC: >1.03 — HIGH (ref 1–1.03)
SQUAMOUS # UR AUTO: 3 /HPF — SIGNIFICANT CHANGE UP (ref 0–5)
TSH SERPL-MCNC: <0.01 UU/ML — LOW (ref 0.34–4.82)
UROBILINOGEN FLD QL: 1 MG/DL — SIGNIFICANT CHANGE UP (ref 0.2–1)
WBC # BLD: 13.23 K/UL — HIGH (ref 3.8–10.5)
WBC # FLD AUTO: 13.23 K/UL — HIGH (ref 3.8–10.5)
WBC UR QL: 8 /HPF — HIGH (ref 0–5)

## 2024-03-01 PROCEDURE — 76856 US EXAM PELVIC COMPLETE: CPT | Mod: 26

## 2024-03-01 PROCEDURE — 99285 EMERGENCY DEPT VISIT HI MDM: CPT

## 2024-03-01 PROCEDURE — 74177 CT ABD & PELVIS W/CONTRAST: CPT | Mod: 26,MC

## 2024-03-01 RX ORDER — HYDROMORPHONE HYDROCHLORIDE 2 MG/ML
0.5 INJECTION INTRAMUSCULAR; INTRAVENOUS; SUBCUTANEOUS ONCE
Refills: 0 | Status: DISCONTINUED | OUTPATIENT
Start: 2024-03-01 | End: 2024-03-01

## 2024-03-01 RX ORDER — SODIUM CHLORIDE 9 MG/ML
1000 INJECTION INTRAMUSCULAR; INTRAVENOUS; SUBCUTANEOUS ONCE
Refills: 0 | Status: COMPLETED | OUTPATIENT
Start: 2024-03-01 | End: 2024-03-01

## 2024-03-01 RX ORDER — PANTOPRAZOLE SODIUM 20 MG/1
40 TABLET, DELAYED RELEASE ORAL ONCE
Refills: 0 | Status: COMPLETED | OUTPATIENT
Start: 2024-03-01 | End: 2024-03-01

## 2024-03-01 RX ORDER — ONDANSETRON 8 MG/1
4 TABLET, FILM COATED ORAL ONCE
Refills: 0 | Status: COMPLETED | OUTPATIENT
Start: 2024-03-01 | End: 2024-03-01

## 2024-03-01 RX ORDER — HYDROMORPHONE HYDROCHLORIDE 2 MG/ML
1 INJECTION INTRAMUSCULAR; INTRAVENOUS; SUBCUTANEOUS ONCE
Refills: 0 | Status: DISCONTINUED | OUTPATIENT
Start: 2024-03-01 | End: 2024-03-01

## 2024-03-01 RX ADMIN — SODIUM CHLORIDE 1000 MILLILITER(S): 9 INJECTION INTRAMUSCULAR; INTRAVENOUS; SUBCUTANEOUS at 15:20

## 2024-03-01 RX ADMIN — SODIUM CHLORIDE 1000 MILLILITER(S): 9 INJECTION INTRAMUSCULAR; INTRAVENOUS; SUBCUTANEOUS at 17:02

## 2024-03-01 RX ADMIN — PANTOPRAZOLE SODIUM 40 MILLIGRAM(S): 20 TABLET, DELAYED RELEASE ORAL at 17:01

## 2024-03-01 RX ADMIN — HYDROMORPHONE HYDROCHLORIDE 0.5 MILLIGRAM(S): 2 INJECTION INTRAMUSCULAR; INTRAVENOUS; SUBCUTANEOUS at 17:01

## 2024-03-01 RX ADMIN — ONDANSETRON 4 MILLIGRAM(S): 8 TABLET, FILM COATED ORAL at 21:23

## 2024-03-01 RX ADMIN — HYDROMORPHONE HYDROCHLORIDE 0.5 MILLIGRAM(S): 2 INJECTION INTRAMUSCULAR; INTRAVENOUS; SUBCUTANEOUS at 18:41

## 2024-03-01 RX ADMIN — HYDROMORPHONE HYDROCHLORIDE 1 MILLIGRAM(S): 2 INJECTION INTRAMUSCULAR; INTRAVENOUS; SUBCUTANEOUS at 21:45

## 2024-03-01 RX ADMIN — SODIUM CHLORIDE 1000 MILLILITER(S): 9 INJECTION INTRAMUSCULAR; INTRAVENOUS; SUBCUTANEOUS at 20:58

## 2024-03-01 NOTE — ED ADULT NURSE NOTE - OBJECTIVE STATEMENT
Pt presents to er with complaints of multiple episodes of emesis since yesterday with luq pain, denies fevers, diarrhea, states she has a history of hypothyroid.

## 2024-03-01 NOTE — ED PROVIDER NOTE - OBJECTIVE STATEMENT
pt is a 37 yo wf with hx Hyperthyroidism poorly controlled with methimazole, vomiting and nausea for one year followed by Dr. Chapman and chronically on zofran, who developed upper abdominal pain like a saw, nausea and bilious vomiting since yesterday. No fever or diarrhea. nonradiating. Pt staes post xavier by Vasquez years ago but feels same way. Unable to nilo anything by mouth for two days including meds.

## 2024-03-01 NOTE — ED ADULT NURSE NOTE - NSFALLUNIVINTERV_ED_ALL_ED
Bed/Stretcher in lowest position, wheels locked, appropriate side rails in place/Call bell, personal items and telephone in reach/Instruct patient to call for assistance before getting out of bed/chair/stretcher/Non-slip footwear applied when patient is off stretcher/[place] to call system/Physically safe environment - no spills, clutter or unnecessary equipment/Purposeful proactive rounding/Room/bathroom lighting operational, light cord in reach

## 2024-03-01 NOTE — CONSULT NOTE ADULT - SUBJECTIVE AND OBJECTIVE BOX
HPI:     38y G0  Last Menstrual Period a few days ago, presents with epigastric pain, nasuea, and vomtiing since yesterday. Patient denies pelvic pain or lower abdomen pain.    PMHX; uncontrolled hyperthyroidism  PSHX; cholecystectoym  POBHX; Denies  PGYNHX: History of PCOS currently managed with OCPs  Allergies: amoxicillin-clavulanate, red dye, shellfish  MEDS: Methimazole      Vital Signs Last 24 Hrs  T(C): 36.7 (01 Mar 2024 18:00), Max: 36.9 (01 Mar 2024 14:26)  T(F): 98.1 (01 Mar 2024 18:00), Max: 98.5 (01 Mar 2024 14:26)  HR: 113 (01 Mar 2024 18:00) (113 - 129)  BP: 143/84 (01 Mar 2024 18:00) (135/94 - 143/84)  BP(mean): 104 (01 Mar 2024 17:00) (104 - 104)  RR: 16 (01 Mar 2024 18:00) (16 - 20)  SpO2: 95% (01 Mar 2024 18:00) (95% - 95%)    Parameters below as of 01 Mar 2024 18:00  Patient On (Oxygen Delivery Method): room air       PHYSICAL EXAM:  GEN: NAD, AAOx3  ABDOMEN: Soft, Nontender, Nondistended  EXTREMITIES:  No clubbing, cyanosis, or edema  PELVIC: patient declined    LABS:                        17.1   13.23 )-----------( 303      ( 01 Mar 2024 15:19 )             48.8     03-01    135  |  101  |  9   ----------------------------<  375<H>  4.2   |  23  |  0.73    Ca    9.9      01 Mar 2024 15:19    TPro  8.3  /  Alb  4.1  /  TBili  0.9  /  DBili  x   /  AST  16  /  ALT  30  /  AlkPhos  153<H>  03-01    Urinalysis Basic - ( 01 Mar 2024 16:14 )    Color: Yellow / Appearance: Clear / SG: >1.030 / pH: x  Gluc: x / Ketone: >=160 mg/dL  / Bili: Negative / Urobili: 1.0 mg/dL   Blood: x / Protein: 30 mg/dL / Nitrite: Negative   Leuk Esterase: Negative / RBC: 3 /HPF / WBC 8 /HPF   Sq Epi: x / Non Sq Epi: 3 /HPF / Bacteria: Negative /HPF          RADIOLOGY STUDIES:     HPI:     38y G0  Last Menstrual Period a few days ago, presents with epigastric pain, nausea and non bilious vomiting since yesterday. Patient denies pelvic pain or lower abdomen pain. She denies fevers, chills, lightheadedness, dizziness, vaginal discharge, vaginal bleeding, or urinary symptoms. Patient reports she has uncontrolled hyperthyroidism. She was recently scheduled to have an endoscopy for GERD and GI symptoms but it was cancelled due to uncontrolled hyperthyroidism.     PMHX; uncontrolled hyperthyroidism  PSHX; cholecystectomy  POBHX; Denies  PGYNHX: History of PCOS currently managed with OCPs  Allergies: amoxicillin-clavulanate, red dye, shellfish  MEDS: Methimazole  FM: diabetes in both mother and father      Vital Signs Last 24 Hrs  T(C): 36.7 (01 Mar 2024 18:00), Max: 36.9 (01 Mar 2024 14:26)  T(F): 98.1 (01 Mar 2024 18:00), Max: 98.5 (01 Mar 2024 14:26)  HR: 113 (01 Mar 2024 18:00) (113 - 129)  BP: 143/84 (01 Mar 2024 18:00) (135/94 - 143/84)  BP(mean): 104 (01 Mar 2024 17:00) (104 - 104)  RR: 16 (01 Mar 2024 18:00) (16 - 20)  SpO2: 95% (01 Mar 2024 18:00) (95% - 95%)    Parameters below as of 01 Mar 2024 18:00  Patient On (Oxygen Delivery Method): room air       PHYSICAL EXAM:  GEN: NAD, AAOx3  ABDOMEN: Soft, Nontender, Nondistended  EXTREMITIES:  No clubbing, cyanosis, or edema  PELVIC: patient declined    LABS:                        17.1   13.23 )-----------( 303      ( 01 Mar 2024 15:19 )             48.8     03-01    135  |  101  |  9   ----------------------------<  375<H>  4.2   |  23  |  0.73    Ca    9.9      01 Mar 2024 15:19    TPro  8.3  /  Alb  4.1  /  TBili  0.9  /  DBili  x   /  AST  16  /  ALT  30  /  AlkPhos  153<H>  03-01    Urinalysis Basic - ( 01 Mar 2024 16:14 )    Color: Yellow / Appearance: Clear / SG: >1.030 / pH: x  Gluc: x / Ketone: >=160 mg/dL  / Bili: Negative / Urobili: 1.0 mg/dL   Blood: x / Protein: 30 mg/dL / Nitrite: Negative   Leuk Esterase: Negative / RBC: 3 /HPF / WBC 8 /HPF   Sq Epi: x / Non Sq Epi: 3 /HPF / Bacteria: Negative /HPF          RADIOLOGY STUDIES:    TVUS FINDINGS:  Uterus: 6.9 cm x 3.1 cm x 4.1 cm. Within normal limits.  Endometrium: Not well delineated.    Right ovary: Limited evaluation on this transabdominal ultrasound.  Tubular and cystic area in the right adnexa not well delineated on this   transabdominal ultrasound likely correlating to findings on CT which were   also seen on study of 6/28/2023.  Left ovary: The limited evaluation on this transabdominal ultrasound.   Left adnexal cyst measuring 6.5 x 4.3 cm corresponding to cyst seen on CT.    Fluid: Trace fluid.    IMPRESSION:  Limited transabdominal ultrasound. Bilateral adnexal cystic areas   corresponding to findings on CT of same day. Difficult delineation of   complexity within this cyst and/or adjacent parenchyma within the limits   of this transabdominal ultrasound. Please note similar findings were seen   on CT of 6/28/2023.

## 2024-03-01 NOTE — CONSULT NOTE ADULT - SUBJECTIVE AND OBJECTIVE BOX
REASON FOR CONSULT: Acidosis/Ketosis/hyperthyroidism    CONSULT REQUESTED BY: Dr. Love    Patient is a 38y old  Female who presents with a chief complaint of     BRIEF HOSPITAL COURSE:   Ms. Martinez is a 38 year old female with PMHx of hyperthyroidism on methimazole, muscle weakness 2/2 hyperthyroidism, chronic n/v for one year, hx cholecystectomy, who presented to the ED on 3/1 with vomiting and abd pain since 1 day prior. Pt found to have high bs enteritis, dehydration and ketones in the blood. Ct also showed hydrosalpinx/pyosalpynx and 6cm cyst. Gyn consulted.     ICU consulted for Ketosis, lactemia, acidosis    Patient seen at the bedside. She reports that she has had chronic N/V for 1 year, but has not experienced abdominal pain. She states she has had epigastric pain for 2 days, which has gotten worse, leading to her to seek care in the ED. She denies fevers, chills, weight loss, chest pain, dyspnea. All other ROS negative.     Events last 24 hours:     PAST MEDICAL & SURGICAL HISTORY:  Asthma      Fibromyalgia      S/P cholecystectomy        Allergies    amoxicillin-clavulanate (Unknown)  red dye (Unknown)  shellfish (Unknown)    Intolerances      FAMILY HISTORY:      Review of Systems:  CONSTITUTIONAL: No fever, chills, or fatigue  EYES: No eye pain, visual disturbances, or discharge  ENMT:  No difficulty hearing, tinnitus, vertigo; No sinus or throat pain  NECK: No pain or stiffness  RESPIRATORY: No cough, wheezing, chills or hemoptysis; No shortness of breath  CARDIOVASCULAR: No chest pain, palpitations, dizziness, or leg swelling  GASTROINTESTINAL: + epigastric pain, nausea, vomiting, negative hematemesis; No diarrhea or constipation. No melena or hematochezia.  GENITOURINARY: No dysuria, frequency, hematuria, or incontinence  NEUROLOGICAL: No headaches, memory loss,  new loss of strength, numbness, or tremors  SKIN: No itching, burning, rashes, or lesions   MUSCULOSKELETAL: No joint pain or swelling; No muscle, back, or extremity pain  PSYCHIATRIC: No depression, anxiety, mood swings, or difficulty sleeping      Medications:                                  ICU Vital Signs Last 24 Hrs  T(C): 36.7 (01 Mar 2024 18:00), Max: 36.9 (01 Mar 2024 14:26)  T(F): 98.1 (01 Mar 2024 18:00), Max: 98.5 (01 Mar 2024 14:26)  HR: 113 (01 Mar 2024 18:00) (113 - 129)  BP: 133/96 (01 Mar 2024 21:45) (133/96 - 143/84)  BP(mean): 107 (01 Mar 2024 21:45) (104 - 107)  ABP: --  ABP(mean): --  RR: 16 (01 Mar 2024 18:00) (16 - 20)  SpO2: 95% (01 Mar 2024 18:00) (95% - 95%)    O2 Parameters below as of 01 Mar 2024 18:00  Patient On (Oxygen Delivery Method): room air          Vital Signs Last 24 Hrs  T(C): 36.7 (01 Mar 2024 18:00), Max: 36.9 (01 Mar 2024 14:26)  T(F): 98.1 (01 Mar 2024 18:00), Max: 98.5 (01 Mar 2024 14:26)  HR: 113 (01 Mar 2024 18:00) (113 - 129)  BP: 133/96 (01 Mar 2024 21:45) (133/96 - 143/84)  BP(mean): 107 (01 Mar 2024 21:45) (104 - 107)  RR: 16 (01 Mar 2024 18:00) (16 - 20)  SpO2: 95% (01 Mar 2024 18:00) (95% - 95%)    Parameters below as of 01 Mar 2024 18:00  Patient On (Oxygen Delivery Method): room air            I&O's Detail        LABS:                        17.1   13.23 )-----------( 303      ( 01 Mar 2024 15:19 )             48.8     03-01    135  |  101  |  9   ----------------------------<  375<H>  4.2   |  23  |  0.73    Ca    9.9      01 Mar 2024 15:19    TPro  8.3  /  Alb  4.1  /  TBili  0.9  /  DBili  x   /  AST  16  /  ALT  30  /  AlkPhos  153<H>  03-01          CAPILLARY BLOOD GLUCOSE      POCT Blood Glucose.: 271 mg/dL (01 Mar 2024 18:33)    PT/INR - ( 01 Mar 2024 15:19 )   PT: 10.7 sec;   INR: 0.95 ratio         PTT - ( 01 Mar 2024 15:19 )  PTT:29.3 sec  Urinalysis Basic - ( 01 Mar 2024 16:14 )    Color: Yellow / Appearance: Clear / SG: >1.030 / pH: x  Gluc: x / Ketone: >=160 mg/dL  / Bili: Negative / Urobili: 1.0 mg/dL   Blood: x / Protein: 30 mg/dL / Nitrite: Negative   Leuk Esterase: Negative / RBC: 3 /HPF / WBC 8 /HPF   Sq Epi: x / Non Sq Epi: 3 /HPF / Bacteria: Negative /HPF      CULTURES:      Physical Examination:    General: No acute distress.  Alert, oriented, interactive, nonfocal    HEENT: Pupils equal, reactive to light.  Symmetric.    PULM: Clear to auscultation bilaterally, no significant sputum production    CVS: Tachycardic, regular rhythm, no murmurs, rubs, or gallops    ABD: Tender to palpation of epigastrium, mild diffuse tenderness in RLQ and LLQ, soft, nondistended, no guarding, no rebound tenderness, normoactive bowel sounds, no masses    EXT: No edema, nontender    SKIN: Warm and well perfused, no rashes noted.    RADIOLOGY: ***    CRITICAL CARE TIME SPENT: ***

## 2024-03-01 NOTE — ED PROVIDER NOTE - CONSTITUTIONAL, MLM
Well appearing, awake, alert, oriented to person, place, time/situation and in no apparent distress. obese. normal...

## 2024-03-01 NOTE — ED PROVIDER NOTE - NS ED SCRIBE STATEMENT
Patient having cold symptoms. Patient tested negative for COVID. Patient has cough and runny nose.    Patient wondering if he can keep his appt or if he needs to reschedule. Patient requested that if he needs to reschedule to not wait till next available.    Please advise.  
Attending

## 2024-03-01 NOTE — ED PROVIDER NOTE - CLINICAL SUMMARY MEDICAL DECISION MAKING FREE TEXT BOX
Plan to get labs, give IV fluids, give Zofran, pain meds, Protonix and reassesses. Pt with hx hyperthyroidism, chronic n/v for one year., with vomiting since yesterday and abd pain. Plan to get labs, give IV fluids, give Zofran, pain meds, Protonix and reassesses.Pt found to have high bs enteritis, dehydration and ketones in the blood. Ivf, icu consult. Ct also showed hydrosalpinx/pyosalpynx and 6cm cyst. Gyn consulted. Pt admitted

## 2024-03-01 NOTE — ED PROVIDER NOTE - CARE PLAN
1 Principal Discharge DX:	Abdominal pain with vomiting  Secondary Diagnosis:	Dehydration  Secondary Diagnosis:	DM ketosis  Secondary Diagnosis:	Enteritis  Secondary Diagnosis:	Hyperthyroidism  Secondary Diagnosis:	Cyst, ovarian  Secondary Diagnosis:	Hydrosalpinx

## 2024-03-01 NOTE — ED ADULT NURSE NOTE - CHIEF COMPLAINT QUOTE
Problem: Patient Care Overview  Goal: Plan of Care Review  Received dialysis yesterday evening and 3 liters taken off. 75cc's of tea colored urine noted before taking out arambula and has not voided since (normal for patient). Slept all night. SBP much more controlled and dialysis and receiving routine antihypertensives.       pt presents to ED from home for abdominal pain, n/v. denies bloody vomit, diarrhea, fever.

## 2024-03-01 NOTE — CONSULT NOTE ADULT - ATTENDING COMMENTS
38y G0  Last Menstrual Period approximately 2/25/24 presents with epigastric pain, nausea and non bilious vomiting since yesterday.    A/P:  -Patient mildly tachycardic, afebrile.  -Patient tender to palpation in epigastric area. Non tender in bilateral lower quadrants.  -Patient denies vaginal or pelvic symptoms.  -Gyn consulted due to bilateral adnexal cysts and hydrosalpinx visualized on CT and US. Symptoms not consistent with PID patient declines further pelvic exam. Findings consistent with patient history of PCOS.  No further gyn management at this time. Patient can continue with OCPs and follow up with Gyn provider.  -Glucose on presentation 375. Hgb: 17  -vBG consistent with compensated metabolic acidosis.  - Hyperthyroidism TSH <0.01  -Further management per ED physician.    GYN signing off at this time please call if you have further questions regarding this patient.    Dano Salmeron DO

## 2024-03-01 NOTE — CONSULT NOTE ADULT - ASSESSMENT
Ms. Martinez is a 38 year old female with PMHx of hyperthyroidism on methimazole, muscle weakness 2/2 hyperthyroidism, chronic n/v for one year, hx cholecystectomy, who is admitted with:   # Nausea/vomiting  # Epigastric pain  # Ketosis  # Lactemia  # Acidosis  # Hyperthyroidism  # Hyperglycemia    Plan/ Recommendations  Nausea/Vomiting  - Recommend symptomatic management    Epigastric pain  - CT abdomen demonstrating enteritis  - Lipase within normal limits     Ketosis  - Likely an element of starvation ketosis given 2 days of nausea/vomiting with no appetite  - Recommend repeating value, trend     Acidosis/ Lactemia  - Mild acidosis, may be 2/2 lactic acidosis  - Lactate has improved with hydration  - Recommend trending values   - Low suspicion for DKA given no anion gap, ordered beta hydroxy-butyrate to r/o. Lab is pending     Hyperthyroidism on methimazole  - Missed doses of methimazole  - Ordered T3/T4, pending  - Would recommend continuing methimazole when able  - Low suspicion for thyroid storm     At this time, the patient does not meet criteria for ICU admission. Please reconsult if her condition changes.   Will discuss plan with eICU attending.      Time spent on this patient encounter, which includes documenting this note in the electronic medical record, was 56 minutes including assessing the presenting problems with associated risks, reviewing the medical record to prepare for the encounter, and meeting face to face with patient to obtain additional history. I have also performed an appropriate physical exam, made interventions listed and ordered and interpreted appropriate diagnostic studies as documented. To improve communication and patient safety, I have coordinated care with the multidisciplinary team including the bedside nurse, appropriate attending of record and consultants as needed.     Date of entry of this note is equal to the date of services rendered       Ms. Martinez is a 38 year old female with PMHx of hyperthyroidism on methimazole, muscle weakness 2/2 hyperthyroidism, chronic n/v for one year, hx cholecystectomy, who is admitted with:   # Nausea/vomiting  # Epigastric pain  # Ketosis  # Lactemia  # Acidosis  # Hyperthyroidism  # Hyperglycemia    Plan/ Recommendations  Nausea/Vomiting  - Recommend symptomatic management    Epigastric pain  - CT abdomen demonstrating enteritis  - Lipase within normal limits     Ketosis  - Likely an element of starvation ketosis given 2 days of nausea/vomiting with no appetite  - Recommend repeating value, trend     Acidosis/ Lactemia  - Mild acidosis, may be 2/2 lactic acidosis  - Lactate has improved with hydration  - Recommend trending values   - Low suspicion for DKA given no anion gap, ordered beta hydroxy-butyrate to r/o. Lab is pending     Hyperthyroidism on methimazole  - Missed doses of methimazole  - Ordered T3/T4, pending  - Would recommend continuing methimazole when able  - Low suspicion for thyroid storm     At this time, the patient does not meet criteria for ICU admission. Please reconsult if her condition changes.   Discussed case with eICU attending Dr. Del Valle.      Time spent on this patient encounter, which includes documenting this note in the electronic medical record, was 56 minutes including assessing the presenting problems with associated risks, reviewing the medical record to prepare for the encounter, and meeting face to face with patient to obtain additional history. I have also performed an appropriate physical exam, made interventions listed and ordered and interpreted appropriate diagnostic studies as documented. To improve communication and patient safety, I have coordinated care with the multidisciplinary team including the bedside nurse, appropriate attending of record and consultants as needed.     Date of entry of this note is equal to the date of services rendered

## 2024-03-01 NOTE — ED PROVIDER NOTE - CRITICAL CARE ATTENDING CONTRIBUTION TO CARE
Critical care time spent evaluating and reevaluating patient, ordering and interpreting diagnostics, ordering therapeutics, speaking to consulting and admitting MDs and documenting. Kate GAUTAM

## 2024-03-01 NOTE — CONSULT NOTE ADULT - ASSESSMENT
38y G0  Last Menstrual Period a few days ago, presents with epigastric pain, nausea and non bilious vomiting since yesterday.    A/P:  -Patient mildly tachycardic, afebrile.  -Patient tender to palpation in epigastric area. Non tender in bilateral lower quadrants.  -Patient denies vaginal pr pelvic symptoms.  -Gyn consulted due to bilateral adnexal cysts and hydrosalpinx visualized on CT and US.   -Findings consistent with patient history of PCOS.   -No further gyn management at this time. Patient can continue with OCPs and follow up with Gyn provider.  -Glucose on presentation 375. Hgb: 17  -vBG consistent with compensated metabolic acidosis.  -Further management per ED physician.    Discussed with Dr. Salmeron

## 2024-03-02 DIAGNOSIS — R10.9 UNSPECIFIED ABDOMINAL PAIN: ICD-10-CM

## 2024-03-02 LAB
A1C WITH ESTIMATED AVERAGE GLUCOSE RESULT: 13.4 % — HIGH (ref 4–5.6)
ANION GAP SERPL CALC-SCNC: 7 MMOL/L — SIGNIFICANT CHANGE UP (ref 5–17)
B-OH-BUTYR SERPL-SCNC: 2.3 MMOL/L — HIGH
BUN SERPL-MCNC: 8 MG/DL — SIGNIFICANT CHANGE UP (ref 7–23)
CALCIUM SERPL-MCNC: 8.9 MG/DL — SIGNIFICANT CHANGE UP (ref 8.5–10.1)
CHLORIDE SERPL-SCNC: 109 MMOL/L — HIGH (ref 96–108)
CO2 SERPL-SCNC: 21 MMOL/L — LOW (ref 22–31)
CREAT SERPL-MCNC: 0.56 MG/DL — SIGNIFICANT CHANGE UP (ref 0.5–1.3)
EGFR: 121 ML/MIN/1.73M2 — SIGNIFICANT CHANGE UP
ESTIMATED AVERAGE GLUCOSE: 338 MG/DL — HIGH (ref 68–114)
GLUCOSE BLDC GLUCOMTR-MCNC: 226 MG/DL — HIGH (ref 70–99)
GLUCOSE BLDC GLUCOMTR-MCNC: 233 MG/DL — HIGH (ref 70–99)
GLUCOSE BLDC GLUCOMTR-MCNC: 233 MG/DL — HIGH (ref 70–99)
GLUCOSE BLDC GLUCOMTR-MCNC: 236 MG/DL — HIGH (ref 70–99)
GLUCOSE BLDC GLUCOMTR-MCNC: 288 MG/DL — HIGH (ref 70–99)
GLUCOSE SERPL-MCNC: 239 MG/DL — HIGH (ref 70–99)
HCT VFR BLD CALC: 40.7 % — SIGNIFICANT CHANGE UP (ref 34.5–45)
HGB BLD-MCNC: 13.8 G/DL — SIGNIFICANT CHANGE UP (ref 11.5–15.5)
MCHC RBC-ENTMCNC: 30.3 PG — SIGNIFICANT CHANGE UP (ref 27–34)
MCHC RBC-ENTMCNC: 33.9 GM/DL — SIGNIFICANT CHANGE UP (ref 32–36)
MCV RBC AUTO: 89.5 FL — SIGNIFICANT CHANGE UP (ref 80–100)
PLATELET # BLD AUTO: 231 K/UL — SIGNIFICANT CHANGE UP (ref 150–400)
POTASSIUM SERPL-MCNC: 3.5 MMOL/L — SIGNIFICANT CHANGE UP (ref 3.5–5.3)
POTASSIUM SERPL-SCNC: 3.5 MMOL/L — SIGNIFICANT CHANGE UP (ref 3.5–5.3)
RBC # BLD: 4.55 M/UL — SIGNIFICANT CHANGE UP (ref 3.8–5.2)
RBC # FLD: 11.9 % — SIGNIFICANT CHANGE UP (ref 10.3–14.5)
SODIUM SERPL-SCNC: 137 MMOL/L — SIGNIFICANT CHANGE UP (ref 135–145)
T3FREE SERPL-MCNC: 5.98 PG/ML — HIGH (ref 2–4.4)
T4 FREE SERPL-MCNC: 2.39 NG/DL — HIGH (ref 0.76–1.46)
WBC # BLD: 10.24 K/UL — SIGNIFICANT CHANGE UP (ref 3.8–10.5)
WBC # FLD AUTO: 10.24 K/UL — SIGNIFICANT CHANGE UP (ref 3.8–10.5)

## 2024-03-02 PROCEDURE — 99223 1ST HOSP IP/OBS HIGH 75: CPT

## 2024-03-02 PROCEDURE — 93010 ELECTROCARDIOGRAM REPORT: CPT

## 2024-03-02 PROCEDURE — 87040 BLOOD CULTURE FOR BACTERIA: CPT

## 2024-03-02 PROCEDURE — 80048 BASIC METABOLIC PNL TOTAL CA: CPT

## 2024-03-02 PROCEDURE — 93005 ELECTROCARDIOGRAM TRACING: CPT

## 2024-03-02 PROCEDURE — 83036 HEMOGLOBIN GLYCOSYLATED A1C: CPT

## 2024-03-02 PROCEDURE — 99222 1ST HOSP IP/OBS MODERATE 55: CPT | Mod: GC

## 2024-03-02 PROCEDURE — 36415 COLL VENOUS BLD VENIPUNCTURE: CPT

## 2024-03-02 PROCEDURE — 85027 COMPLETE CBC AUTOMATED: CPT

## 2024-03-02 PROCEDURE — C9113: CPT

## 2024-03-02 PROCEDURE — 82962 GLUCOSE BLOOD TEST: CPT

## 2024-03-02 RX ORDER — DEXTROSE 50 % IN WATER 50 %
12.5 SYRINGE (ML) INTRAVENOUS ONCE
Refills: 0 | Status: DISCONTINUED | OUTPATIENT
Start: 2024-03-02 | End: 2024-03-05

## 2024-03-02 RX ORDER — METRONIDAZOLE 500 MG
500 TABLET ORAL EVERY 8 HOURS
Refills: 0 | Status: DISCONTINUED | OUTPATIENT
Start: 2024-03-02 | End: 2024-03-05

## 2024-03-02 RX ORDER — GLUCAGON INJECTION, SOLUTION 0.5 MG/.1ML
1 INJECTION, SOLUTION SUBCUTANEOUS ONCE
Refills: 0 | Status: DISCONTINUED | OUTPATIENT
Start: 2024-03-02 | End: 2024-03-05

## 2024-03-02 RX ORDER — HYDROMORPHONE HYDROCHLORIDE 2 MG/ML
0.5 INJECTION INTRAMUSCULAR; INTRAVENOUS; SUBCUTANEOUS ONCE
Refills: 0 | Status: DISCONTINUED | OUTPATIENT
Start: 2024-03-02 | End: 2024-03-02

## 2024-03-02 RX ORDER — SODIUM CHLORIDE 9 MG/ML
1000 INJECTION, SOLUTION INTRAVENOUS
Refills: 0 | Status: DISCONTINUED | OUTPATIENT
Start: 2024-03-02 | End: 2024-03-05

## 2024-03-02 RX ORDER — METOCLOPRAMIDE HCL 10 MG
10 TABLET ORAL EVERY 8 HOURS
Refills: 0 | Status: DISCONTINUED | OUTPATIENT
Start: 2024-03-02 | End: 2024-03-05

## 2024-03-02 RX ORDER — ENOXAPARIN SODIUM 100 MG/ML
40 INJECTION SUBCUTANEOUS EVERY 24 HOURS
Refills: 0 | Status: DISCONTINUED | OUTPATIENT
Start: 2024-03-02 | End: 2024-03-05

## 2024-03-02 RX ORDER — INSULIN LISPRO 100/ML
VIAL (ML) SUBCUTANEOUS AT BEDTIME
Refills: 0 | Status: DISCONTINUED | OUTPATIENT
Start: 2024-03-02 | End: 2024-03-02

## 2024-03-02 RX ORDER — PANTOPRAZOLE SODIUM 20 MG/1
1 TABLET, DELAYED RELEASE ORAL
Refills: 0 | DISCHARGE

## 2024-03-02 RX ORDER — INSULIN HUMAN 100 [IU]/ML
4 INJECTION, SOLUTION SUBCUTANEOUS ONCE
Refills: 0 | Status: DISCONTINUED | OUTPATIENT
Start: 2024-03-02 | End: 2024-03-02

## 2024-03-02 RX ORDER — INSULIN GLARGINE 100 [IU]/ML
5 INJECTION, SOLUTION SUBCUTANEOUS AT BEDTIME
Refills: 0 | Status: DISCONTINUED | OUTPATIENT
Start: 2024-03-02 | End: 2024-03-03

## 2024-03-02 RX ORDER — KETOROLAC TROMETHAMINE 30 MG/ML
30 SYRINGE (ML) INJECTION ONCE
Refills: 0 | Status: DISCONTINUED | OUTPATIENT
Start: 2024-03-02 | End: 2024-03-02

## 2024-03-02 RX ORDER — ACETAMINOPHEN 500 MG
650 TABLET ORAL EVERY 6 HOURS
Refills: 0 | Status: DISCONTINUED | OUTPATIENT
Start: 2024-03-02 | End: 2024-03-05

## 2024-03-02 RX ORDER — LANOLIN ALCOHOL/MO/W.PET/CERES
3 CREAM (GRAM) TOPICAL AT BEDTIME
Refills: 0 | Status: DISCONTINUED | OUTPATIENT
Start: 2024-03-02 | End: 2024-03-05

## 2024-03-02 RX ORDER — ONDANSETRON 8 MG/1
1 TABLET, FILM COATED ORAL
Refills: 0 | DISCHARGE

## 2024-03-02 RX ORDER — ACETAMINOPHEN 500 MG
1000 TABLET ORAL ONCE
Refills: 0 | Status: COMPLETED | OUTPATIENT
Start: 2024-03-02 | End: 2024-03-02

## 2024-03-02 RX ORDER — INSULIN LISPRO 100/ML
VIAL (ML) SUBCUTANEOUS AT BEDTIME
Refills: 0 | Status: DISCONTINUED | OUTPATIENT
Start: 2024-03-02 | End: 2024-03-05

## 2024-03-02 RX ORDER — GABAPENTIN 400 MG/1
0 CAPSULE ORAL
Qty: 0 | Refills: 0 | DISCHARGE

## 2024-03-02 RX ORDER — PANTOPRAZOLE SODIUM 20 MG/1
40 TABLET, DELAYED RELEASE ORAL DAILY
Refills: 0 | Status: DISCONTINUED | OUTPATIENT
Start: 2024-03-02 | End: 2024-03-02

## 2024-03-02 RX ORDER — CIPROFLOXACIN LACTATE 400MG/40ML
400 VIAL (ML) INTRAVENOUS EVERY 12 HOURS
Refills: 0 | Status: DISCONTINUED | OUTPATIENT
Start: 2024-03-02 | End: 2024-03-05

## 2024-03-02 RX ORDER — METRONIDAZOLE 500 MG
500 TABLET ORAL ONCE
Refills: 0 | Status: COMPLETED | OUTPATIENT
Start: 2024-03-02 | End: 2024-03-02

## 2024-03-02 RX ORDER — ONDANSETRON 8 MG/1
4 TABLET, FILM COATED ORAL EVERY 6 HOURS
Refills: 0 | Status: DISCONTINUED | OUTPATIENT
Start: 2024-03-02 | End: 2024-03-05

## 2024-03-02 RX ORDER — METHIMAZOLE 10 MG/1
0.5 TABLET ORAL
Refills: 0 | DISCHARGE

## 2024-03-02 RX ORDER — SODIUM CHLORIDE 9 MG/ML
1000 INJECTION INTRAMUSCULAR; INTRAVENOUS; SUBCUTANEOUS ONCE
Refills: 0 | Status: DISCONTINUED | OUTPATIENT
Start: 2024-03-02 | End: 2024-03-02

## 2024-03-02 RX ORDER — ONDANSETRON 8 MG/1
4 TABLET, FILM COATED ORAL EVERY 8 HOURS
Refills: 0 | Status: DISCONTINUED | OUTPATIENT
Start: 2024-03-02 | End: 2024-03-02

## 2024-03-02 RX ORDER — SODIUM CHLORIDE 9 MG/ML
1000 INJECTION INTRAMUSCULAR; INTRAVENOUS; SUBCUTANEOUS
Refills: 0 | Status: DISCONTINUED | OUTPATIENT
Start: 2024-03-02 | End: 2024-03-03

## 2024-03-02 RX ORDER — SODIUM CHLORIDE 9 MG/ML
1000 INJECTION INTRAMUSCULAR; INTRAVENOUS; SUBCUTANEOUS
Refills: 0 | Status: DISCONTINUED | OUTPATIENT
Start: 2024-03-02 | End: 2024-03-02

## 2024-03-02 RX ORDER — DEXTROSE 50 % IN WATER 50 %
25 SYRINGE (ML) INTRAVENOUS ONCE
Refills: 0 | Status: DISCONTINUED | OUTPATIENT
Start: 2024-03-02 | End: 2024-03-05

## 2024-03-02 RX ORDER — PANTOPRAZOLE SODIUM 20 MG/1
40 TABLET, DELAYED RELEASE ORAL DAILY
Refills: 0 | Status: DISCONTINUED | OUTPATIENT
Start: 2024-03-02 | End: 2024-03-04

## 2024-03-02 RX ORDER — INSULIN LISPRO 100/ML
VIAL (ML) SUBCUTANEOUS
Refills: 0 | Status: DISCONTINUED | OUTPATIENT
Start: 2024-03-02 | End: 2024-03-05

## 2024-03-02 RX ORDER — DEXTROSE 50 % IN WATER 50 %
15 SYRINGE (ML) INTRAVENOUS ONCE
Refills: 0 | Status: DISCONTINUED | OUTPATIENT
Start: 2024-03-02 | End: 2024-03-05

## 2024-03-02 RX ORDER — MORPHINE SULFATE 50 MG/1
2 CAPSULE, EXTENDED RELEASE ORAL EVERY 4 HOURS
Refills: 0 | Status: DISCONTINUED | OUTPATIENT
Start: 2024-03-02 | End: 2024-03-03

## 2024-03-02 RX ORDER — INSULIN LISPRO 100/ML
VIAL (ML) SUBCUTANEOUS
Refills: 0 | Status: DISCONTINUED | OUTPATIENT
Start: 2024-03-02 | End: 2024-03-02

## 2024-03-02 RX ORDER — METRONIDAZOLE 500 MG
TABLET ORAL
Refills: 0 | Status: DISCONTINUED | OUTPATIENT
Start: 2024-03-02 | End: 2024-03-05

## 2024-03-02 RX ADMIN — HYDROMORPHONE HYDROCHLORIDE 0.5 MILLIGRAM(S): 2 INJECTION INTRAMUSCULAR; INTRAVENOUS; SUBCUTANEOUS at 01:44

## 2024-03-02 RX ADMIN — SODIUM CHLORIDE 100 MILLILITER(S): 9 INJECTION INTRAMUSCULAR; INTRAVENOUS; SUBCUTANEOUS at 21:46

## 2024-03-02 RX ADMIN — Medication 4: at 16:46

## 2024-03-02 RX ADMIN — Medication 4: at 12:09

## 2024-03-02 RX ADMIN — SODIUM CHLORIDE 100 MILLILITER(S): 9 INJECTION INTRAMUSCULAR; INTRAVENOUS; SUBCUTANEOUS at 12:08

## 2024-03-02 RX ADMIN — Medication 30 MILLIGRAM(S): at 13:22

## 2024-03-02 RX ADMIN — Medication 400 MILLIGRAM(S): at 01:45

## 2024-03-02 RX ADMIN — Medication 2: at 21:43

## 2024-03-02 RX ADMIN — Medication 30 MILLIGRAM(S): at 12:22

## 2024-03-02 RX ADMIN — ENOXAPARIN SODIUM 40 MILLIGRAM(S): 100 INJECTION SUBCUTANEOUS at 12:08

## 2024-03-02 RX ADMIN — ONDANSETRON 4 MILLIGRAM(S): 8 TABLET, FILM COATED ORAL at 10:08

## 2024-03-02 RX ADMIN — Medication 100 MILLIGRAM(S): at 21:42

## 2024-03-02 RX ADMIN — Medication 3 MILLIGRAM(S): at 21:42

## 2024-03-02 RX ADMIN — SODIUM CHLORIDE 125 MILLILITER(S): 9 INJECTION INTRAMUSCULAR; INTRAVENOUS; SUBCUTANEOUS at 01:45

## 2024-03-02 RX ADMIN — Medication 100 MILLIGRAM(S): at 14:41

## 2024-03-02 RX ADMIN — PANTOPRAZOLE SODIUM 40 MILLIGRAM(S): 20 TABLET, DELAYED RELEASE ORAL at 12:08

## 2024-03-02 RX ADMIN — ONDANSETRON 4 MILLIGRAM(S): 8 TABLET, FILM COATED ORAL at 20:13

## 2024-03-02 RX ADMIN — MORPHINE SULFATE 2 MILLIGRAM(S): 50 CAPSULE, EXTENDED RELEASE ORAL at 20:28

## 2024-03-02 RX ADMIN — INSULIN GLARGINE 5 UNIT(S): 100 INJECTION, SOLUTION SUBCUTANEOUS at 21:43

## 2024-03-02 RX ADMIN — MORPHINE SULFATE 2 MILLIGRAM(S): 50 CAPSULE, EXTENDED RELEASE ORAL at 12:38

## 2024-03-02 RX ADMIN — MORPHINE SULFATE 2 MILLIGRAM(S): 50 CAPSULE, EXTENDED RELEASE ORAL at 13:38

## 2024-03-02 RX ADMIN — MORPHINE SULFATE 2 MILLIGRAM(S): 50 CAPSULE, EXTENDED RELEASE ORAL at 20:13

## 2024-03-02 RX ADMIN — Medication 200 MILLIGRAM(S): at 23:19

## 2024-03-02 RX ADMIN — Medication 200 MILLIGRAM(S): at 12:08

## 2024-03-02 RX ADMIN — Medication 100 MILLIGRAM(S): at 12:08

## 2024-03-02 NOTE — H&P ADULT - HISTORY OF PRESENT ILLNESS
Chief Complaint: abdominal pain.    · Chief Complaint: The patient is a 38y Female complaining of abdominal pain.  · HPI Objective Statement: pt is a 39 yo wf with hx Hyperthyroidism poorly controlled with methimazole, vomiting and nausea for one year followed by Dr. Chapman and chronically on zofran, who developed upper abdominal pain like a saw, nausea and bilious vomiting since yesterday. No fever or diarrhea. nonradiating. Pt staes post xavier by [facility]quez years ago but feels same way. Unable to nilo anything by mouth for two days including meds.   Chief Complaint: abdominal pain.    The patient is a 38y moderately obese Female with significant PMH of Hyperthyroidism poorly controlled with methimazole (diagnosed in 11/2023), PCOD ( Polycystic ovarial disease), GERD, Fibromyalgia and others presented in ED with c/o vague abdominal pain, non-bloody vomiting and nausea.  Patient says that she intermittent nausea and vomiting for more than a year, and she takes Zofran and protonix as needed.  But for last 2-3 days, she has more nausea and vomiting as well as abdominal pain.  Her abdominal pain is more marked in epigastric area, and current pain level is 5/10 in intensity, without radiation and no aggravating or alleviating factors. She also feels hot and warm all the time.  She says that she has been drinking a lot of water, and she still feels dehydrated. She denies palpitation or diarrhea.  Her appetite is not good for last 2-3 days due to pain and nausea. She says that she was diagnosed with PCOD as teenaged, and takes OCP intermittently. She was diagnosed hyperthyroidism in last November and she follows with her Endocrinologist Dr Conway with [facility]. She says that she was initially started on Methimazole 10 mg two times daily, and then it was discontinued for a month as her thyroid was ok.  Then, she had f/u with her endocrinologist about 7-10 days ago, and was started on Methimazole 2.5 mg po daily 5 days ago.  She had not taken her Methimazole yesterday (03/01/2024) due to abd pain and nausea.   Denies smoking, alcohol or substance abuse.  She takes Methimazole, Protonix and Zofran at home.    Sig labs: WBC 13.23k with N 82%, Lactate 3.2->1.6, Glucose 375, Bicarb 23, AG 11, Acetone moderate, TSH low <0.01, FT4 high 2.39, FT3 pending, Lipase normal at 18.   UA negative.  RVP negative.  A1c pending.  CT abd/pelvis: Positive for Enteritis, Right hydrosalpinx/pyosalpinx and Left adenexal cyst likely hemorrhagic.    US pelvis transabdominal: B/L adenexal cystic areas like in CT abd.pelvis.    Seen by OBG/GYN in ED: Consistent with h/o PCOD. No acute intervention, and f/u as outpatient.  Seen by ICU in ED: Not in DKA or thyroid storm, declined for medicine admission.    IV fluid NS 4 L bolus, regular insulin 4 units sq, Dilaudid IV 3 doses and Zofran given in ED.

## 2024-03-02 NOTE — H&P ADULT - HIV OFFER
[facility]     Jayda Lopes  1934   [id number]      Patient Care Team:  Akilah Rueda MD as PCP - General (Internal Medicine)  Akilah Rueda MD as PCP - Internal Medicine (Internal Medicine)  Bernadette Almodovar MD as Consulting Physician (Dermatology)  Hiren Kaufman MD as Consulting Physician (Cardiac Electrophysiology)    Chief Complaint   Patient presents with   • Hyperlipidemia   • Hypertension            HPI  Patient is a 88 y.o. female who presents today for a follow-up visit.    Cough  The patient complains of a year-round cough with thick mucus. Additionally, she complains of intermittent rhinorrhea. She has not noticed if the mucus is worse after eating. The patient has tried nasal sprays in the past, but has not been using them lately. She has to clear her throat a lot. She will try Flonase nasal spray again and add ipratropium nasal spray as well twice daily.    Right upper quadrant pain  The patient complains of intermittent right upper quadrant pain, almost at the waist. She states that the pain does not last long, but it is painful. She denies nausea.    Constipation  The patient complains of constipation. She wonders if her transverse colon is the cause of her right upper quadrant pain. She states that if she drinks a lot of water, it helps with elimination. The patient has not been taking a stool softener lately, and will try that to see if it helps.    Inguinal hernia   She reports a small bulge that is not painful. The patient will avoid lifting heavy objects.    Hypertension  The patient monitors her blood pressure at home. Last night her blood pressure was around 120/70-something mmHg. Today, her blood pressure is 150/78 mmHg. She denies any side effects from her medications. On recheck her blood pressure is 136/80 mmHg.    Shortness of breath  The patient complains of mild shortness of breath when going up and down stairs. She states that it has not  worsened. The patient has been trying to exercise. She denies chest pain.    Hyperlipidemia  The patient is taking atorvastatin.    Varicose veins  The patient is wearing support socks.    Health maintenance  - The patient denies any urinary symptoms.   - Her last colonoscopy was in 2019, which showed some polyps. She was advised to repeat the colonoscopy in 3 years. Her mother passed away from colon cancer. She will schedule a repeat colonoscopy.        CHRONIC CONDITIONS      Past Medical History:   Diagnosis Date   • Acute bronchitis due to infection 1/10/2019   • Anxiety    • Atrial fibrillation (HCC)    • Atypical chest pain    • Bradycardia    • Contusion of right lower leg 1/5/2021 2/8/2021 Akilah Rueda MD  Contusion R shin after fall.  Improving.  There is still some tenderness to touch.     • GERD (gastroesophageal reflux disease)    • Hyperlipidemia    • Hypertension    • Impacted cerumen of right ear 7/20/2020    Continue debrox as directed.  She will need another week or two of drops to help soften.   • Macular degeneration    • Mitral valve prolapse    • Palpitation    • Right leg swelling 1/5/2021 1/5/2021 Akilah Rueda MD  Mild R lower leg swelling due to contusion and injury after fall.   Continue to elevate the leg several times a day.        Past Surgical History:   Procedure Laterality Date   • CYSTECTOMY      h/o Ovarian   • HYSTERECTOMY Bilateral    • OOPHORECTOMY Bilateral 1993   • THYROIDECTOMY      h/o thyroid surgery sub-total        Family History   Problem Relation Age of Onset   • Colon cancer Mother    • Other Mother         cardiac arrhythmia   • Heart failure Mother    • Cancer Mother    • Lung cancer Father    • Cancer Father    • No Known Problems Brother    • Hypertension Maternal Grandmother    • Breast cancer Neg Hx    • Ovarian cancer Neg Hx        Social History     Socioeconomic History   • Marital status:    Tobacco Use   • Smoking status: Never   •  "Smokeless tobacco: Never   Substance and Sexual Activity   • Alcohol use: Yes     Comment: rarely   • Drug use: Defer   • Sexual activity: Defer       Allergies   Allergen Reactions   • Phenazopyridine Hcl Unknown (See Comments)     Pyridium         Vital Signs  Vitals:    02/13/23 1008 02/13/23 1032   BP: 150/78 136/78   BP Location: Left arm Left arm   Patient Position: Sitting Sitting   Cuff Size: Adult Adult   Pulse: 80    Temp: 97.8 °F (36.6 °C)    TempSrc: Infrared    SpO2: 100%    Weight: 73.7 kg (162 lb 6.4 oz)  Comment: Pt refused weight; this is pt provided    Height: 172.7 cm (67.99\")      Body mass index is 24.7 kg/m².  BMI is within normal parameters. No other follow-up for BMI required.        Current Outpatient Medications:   •  Aflibercept (EYLEA IO), Inject  into the eye Every 3 (Three) Months., Disp: , Rfl:   •  amLODIPine (NORVASC) 2.5 MG tablet, TAKE 1 TABLET BY MOUTH TWO TIMES A DAY, Disp: 180 tablet, Rfl: 1  •  atorvastatin (LIPITOR) 20 MG tablet, TAKE 1 TABLET EVERY EVENING, Disp: 90 tablet, Rfl: 3  •  carvedilol (COREG) 25 MG tablet, TAKE 1 TABLET BY MOUTH TWO TIMES A DAY, Disp: 180 tablet, Rfl: 3  •  Cholecalciferol (VITAMIN D3) 2000 units capsule, Take 1 tablet by mouth Daily., Disp: , Rfl:   •  esomeprazole (nexIUM) 20 MG capsule, TAKE 1 CAPSULE DAILY (Patient taking differently: As Needed.), Disp: 90 capsule, Rfl: 3  •  guaiFENesin (Mucinex) 600 MG 12 hr tablet, Take 2 tablets by mouth 2 (Two) Times a Day. (Patient taking differently: Take 1,200 mg by mouth Daily As Needed.), Disp: 180 tablet, Rfl: 1  •  latanoprost (XALATAN) 0.005 % ophthalmic solution, 1 drop Every Night., Disp: , Rfl:   •  Multiple Vitamins-Minerals (PRESERVISION AREDS 2 PO), Take 2 tablets by mouth Daily., Disp: , Rfl:   •  promethazine-dextromethorphan (PROMETHAZINE-DM) 6.25-15 MG/5ML syrup, Take 5 mL by mouth 4 (Four) Times a Day As Needed for Cough., Disp: 240 mL, Rfl: 1  •  ramipril (ALTACE) 5 MG capsule, TAKE 1 " CAPSULE EVERY MORNING AND 2 CAPSULES EVERY EVENING, Disp: 270 capsule, Rfl: 3  •  sertraline (ZOLOFT) 100 MG tablet, TAKE 1 TABLET BY MOUTH EVERY DAY, Disp: 90 tablet, Rfl: 1  •  Xarelto 20 MG tablet, TAKE 1 TABLET DAILY AS DIRECTED, Disp: 90 tablet, Rfl: 3  •  alendronate (FOSAMAX) 70 MG tablet, Take 1 tablet by mouth Every 7 (Seven) Days., Disp: 15 tablet, Rfl: 1  •  docusate sodium (Colace) 100 MG capsule, Take 1 capsule by mouth 2 (Two) Times a Day., Disp: 60 capsule, Rfl: 5  •  fluticasone (FLONASE) 50 MCG/ACT nasal spray, 1 spray into the nostril(s) as directed by provider 2 (Two) Times a Day., Disp: 16 g, Rfl: 11  •  ipratropium (ATROVENT) 0.06 % nasal spray, 2 sprays into the nostril(s) as directed by provider 2 (Two) Times a Day., Disp: 1 each, Rfl: 5    Physical Exam:    Physical Exam  Vitals and nursing note reviewed.   Constitutional:       Appearance: She is well-developed.   HENT:      Head: Normocephalic.   Eyes:      Conjunctiva/sclera: Conjunctivae normal.      Pupils: Pupils are equal, round, and reactive to light.   Neck:      Thyroid: No thyromegaly.   Cardiovascular:      Rate and Rhythm: Normal rate and regular rhythm.      Heart sounds: Normal heart sounds.      Comments: No edema. She has support socks in place.  Pulmonary:      Effort: Pulmonary effort is normal.      Breath sounds: Normal breath sounds.   Musculoskeletal:         General: Normal range of motion.      Cervical back: Normal range of motion and neck supple.   Lymphadenopathy:      Cervical: No cervical adenopathy.   Neurological:      Mental Status: She is alert and oriented to person, place, and time.   Psychiatric:         Thought Content: Thought content normal.          ACE III MINI        Results Review:    None    CMP:  Lab Results   Component Value Date    BUN 13 07/22/2022    CREATININE 0.61 07/22/2022    EGFRIFNONA 78 07/21/2021    BCR 21.3 07/22/2022     07/22/2022    K 4.6 07/22/2022    CO2 28.0 07/22/2022     CALCIUM 9.1 07/22/2022    ALBUMIN 4.10 07/22/2022    BILITOT 0.9 07/22/2022    ALKPHOS 87 07/22/2022    AST 24 07/22/2022    ALT 18 07/22/2022     HbA1c:  Lab Results   Component Value Date    HGBA1C 6.0 09/07/2014    HGBA1C 6.1 (H) 05/14/2014     Microalbumin:  Lab Results   Component Value Date    MICROALBUR 1.4 07/22/2022     Lipid Panel  Lab Results   Component Value Date    CHOL 160 07/22/2022    TRIG 65 07/22/2022    HDL 65 (H) 07/22/2022    LDL 82 07/22/2022    AST 24 07/22/2022    ALT 18 07/22/2022       Medication Review: Medications reviewed and noted  Patient Instructions   Problem List Items Addressed This Visit        Cardiac and Vasculature    Mixed hyperlipidemia (Chronic)    Overview     Taking atorvastatin every evening.          Current Assessment & Plan     The patient will continue atorvastatin every evening. She will continue to eat a low-fat, low-sugar diet.         Relevant Medications    atorvastatin (LIPITOR) 20 MG tablet    Other Relevant Orders    Lipid Panel    Permanent atrial fibrillation (HCC)    Overview     Taking Xarelto and carvedilol.  Sees Dr. Kaufman.          Current Assessment & Plan     She will continue carvedilol and Xarelto. The patient will continue to avoid excessive caffeine and chocolate.         Relevant Medications    Xarelto 20 MG tablet    carvedilol (COREG) 25 MG tablet    amLODIPine (NORVASC) 2.5 MG tablet    Varicose veins of both lower extremities without ulcer or inflammation    Overview     Wear support hose/socks daily.           Current Assessment & Plan     The patient will continue wearing support hose or support socks daily. She will continue to avoid salt in the diet and elevate her feet when sitting at home.         Benign essential hypertension - Primary    Overview     Taking amlodipine and carvedilol and ramipril twice a day.           Current Assessment & Plan     Since her blood pressures are well controlled at home, she will continue her  current doses of amlodipine, carvedilol, and ramipril. She will continue to avoid salt in the diet..         Relevant Medications    ramipril (ALTACE) 5 MG capsule    carvedilol (COREG) 25 MG tablet    amLODIPine (NORVASC) 2.5 MG tablet    Other Relevant Orders    Comprehensive Metabolic Panel    Microalbumin / Creatinine Urine Ratio - Urine, Clean Catch    Urinalysis With Microscopic - Urine, Clean Catch       Endocrine and Metabolic    B12 deficiency    Relevant Orders    Vitamin B12    Vitamin D deficiency    Relevant Orders    Vitamin D,25-Hydroxy       Gastrointestinal Abdominal     Non-recurrent unilateral inguinal hernia without obstruction or gangrene (Chronic)    Overview     R inguinal hernia is asymptomatic.         Current Assessment & Plan     The patient will continue to avoid lifting heavy things. She will let us know if it ever starts to bother her.         RUQ abdominal pain (Chronic)    Current Assessment & Plan     We discussed that it is most likely related to constipation and stool moving around the right flexure. She will work on improving stool movement by taking 2 of the stool softeners per day and drinking a lot of fluids.         GERD without esophagitis    Overview     Taking esomeprazole as needed.         Current Assessment & Plan     She will continue esomeprazole as needed. The patient will continue to avoid eating close to bedtime and avoid large meals.         Relevant Medications    esomeprazole (nexIUM) 20 MG capsule    Constipation, slow transit    Overview     Not taking Colace tablets         Current Assessment & Plan     She is advised to take 2 of the Colace stool softeners per day. May take 1 tablet twice per day or may take 2 of them once per day.            Pulmonary and Pneumonias    Shortness of breath    Current Assessment & Plan     Her symptoms are stable. She will continue trying to do her regular exercise, which does help lung function and stamina as well.                 Diagnosis Plan   1. Benign essential hypertension  Comprehensive Metabolic Panel    Microalbumin / Creatinine Urine Ratio - Urine, Clean Catch    Urinalysis With Microscopic - Urine, Clean Catch      2. Constipation, slow transit        3. RUQ abdominal pain        4. Non-recurrent unilateral inguinal hernia without obstruction or gangrene        5. Mixed hyperlipidemia  Lipid Panel      6. Permanent atrial fibrillation (HCC)        7. Varicose veins of both lower extremities without ulcer or inflammation        8. Shortness of breath        9. GERD without esophagitis        10. B12 deficiency  Vitamin B12      11. Vitamin D deficiency  Vitamin D,25-Hydroxy        Follow-up: She will come back to see me in 07/2023 for annual wellness visit.        Plan of care reviewed with patient at the conclusion of today's visit. Education was provided regarding diagnosis, management, and any prescribed or recommended OTC medications.Patient verbalizes understanding of and agreement with management plan.         Akilah Rueda MD      Transcribed from ambient dictation for Akilah Rueda MD by Gissel Olmedo.  02/13/23   12:46 EST    Patient or patient representative verbalized consent to the visit recording.  I have personally performed the services described in this document as transcribed by the above individual, and it is both accurate and complete.       Opt out

## 2024-03-02 NOTE — H&P ADULT - NSHPLABSRESULTS_GEN_ALL_CORE
LABS:                        17.1   13.23 )-----------( 303      ( 01 Mar 2024 15:19 )             48.8     03-01    135  |  101  |  9   ----------------------------<  375<H>  4.2   |  23  |  0.73    Ca    9.9      01 Mar 2024 15:19    TPro  8.3  /  Alb  4.1  /  TBili  0.9  /  DBili  x   /  AST  16  /  ALT  30  /  AlkPhos  153<H>  03-01    PT/INR - ( 01 Mar 2024 15:19 )   PT: 10.7 sec;   INR: 0.95 ratio         PTT - ( 01 Mar 2024 15:19 )  PTT:29.3 sec        < from: CT Abdomen and Pelvis w/ IV Cont (03.01.24 @ 15:34) >    IMPRESSION:    1. Enteritis.  2. Right hydrosalpinx/pyosalpinx.  3. Left adnexal cyst (6.1 cm), possibly hemorrhagic.        --- End of Report ---    < end of copied text >        < from: US Pelvis Complete (US Pelvis Complete .) (03.01.24 @ 16:42) >    IMPRESSION:  Limited transabdominal ultrasound. Bilateral adnexal cystic areas   corresponding to findings on CT of same day. Difficult delineation of   complexity within this cyst and/or adjacent parenchyma within the limits   of this transabdominal ultrasound. Please note similar findings were seen   on CT of 6/28/2023.        --- End of Report ---    < end of copied text >

## 2024-03-02 NOTE — PROVIDER CONTACT NOTE (EICU) - BACKGROUND
labs reviewed  < from: CT Abdomen and Pelvis w/ IV Cont (03.01.24 @ 15:34) >    1. Enteritis.  2. Right hydrosalpinx/pyosalpinx.  3. Left adnexal cyst (6.1 cm), possibly hemorrhagic.    < end of copied text >

## 2024-03-02 NOTE — PATIENT PROFILE ADULT - FALL HARM RISK - HARM RISK INTERVENTIONS

## 2024-03-02 NOTE — ED ADULT NURSE REASSESSMENT NOTE - NS ED NURSE REASSESS COMMENT FT1
Pt resting comfortably in stretcher, respirations equal and unlabored. VSS, safety precautions in place. Pt updated on plan of care verbalized understanding, awaiting bed placement.

## 2024-03-02 NOTE — ED ADULT NURSE REASSESSMENT NOTE - NS ED NURSE REASSESS COMMENT FT1
pt aaox4, pt refused morphine, called phleb to draw 2 sets of blood cultures, called pharmacy to tube methimazole.  report given, awaiting for transporter

## 2024-03-02 NOTE — H&P ADULT - NSICDXPASTMEDICALHX_GEN_ALL_CORE_FT
PAST MEDICAL HISTORY:  Asthma     Fibromyalgia      PAST MEDICAL HISTORY:  Asthma     Fibromyalgia     GERD (gastroesophageal reflux disease)     Moderate obesity     Polycystic ovary disease     Primary hyperthyroidism

## 2024-03-02 NOTE — H&P ADULT - NSHPPHYSICALEXAM_GEN_ALL_CORE
PHYSICAL EXAM:  GENERAL: NAD, lying in bed comfortably  HEAD:  Atraumatic, Normocephalic  EYES: EOMI, PERRLA, conjunctiva and sclera clear  ENT: Moist mucous membranes  NECK: Supple, No JVD  CHEST/LUNG: Clear to auscultation bilaterally; No rales, rhonchi, wheezing, or rubs. Unlabored respirations  HEART: Regular rate and rhythm; No murmurs, rubs, or gallops  ABDOMEN: Bowel sounds present; Soft, Tender in epigastric area with guarding on deep palpation, No rigidity. Nondistended. No hepatomegaly  EXTREMITIES:  2+ Peripheral Pulses, brisk capillary refill. No clubbing, cyanosis, or edema  NERVOUS SYSTEM:  Alert & Oriented X3, speech clear. No deficits   MSK: FROM all 4 extremities, full and equal strength  SKIN: No rashes or lesions

## 2024-03-02 NOTE — PROVIDER CONTACT NOTE (EICU) - SITUATION
38 year old female with PMHx of hyperthyroidism on methimazole, muscle weakness 2/2 hyperthyroidism, chronic n/v for one year, hx cholecystectomy, who presented to the ED on 3/1 with vomiting and abd pain since 1 day prior. Pt found to have high bs enteritis, dehydration and ketones in the blood.  Case discussed with the ICU PA.

## 2024-03-03 LAB
ANION GAP SERPL CALC-SCNC: 6 MMOL/L — SIGNIFICANT CHANGE UP (ref 5–17)
BUN SERPL-MCNC: 7 MG/DL — SIGNIFICANT CHANGE UP (ref 7–23)
CALCIUM SERPL-MCNC: 8.3 MG/DL — LOW (ref 8.5–10.1)
CHLORIDE SERPL-SCNC: 112 MMOL/L — HIGH (ref 96–108)
CO2 SERPL-SCNC: 24 MMOL/L — SIGNIFICANT CHANGE UP (ref 22–31)
CREAT SERPL-MCNC: 0.4 MG/DL — LOW (ref 0.5–1.3)
CULTURE RESULTS: ABNORMAL
EGFR: 131 ML/MIN/1.73M2 — SIGNIFICANT CHANGE UP
GLUCOSE BLDC GLUCOMTR-MCNC: 129 MG/DL — HIGH (ref 70–99)
GLUCOSE BLDC GLUCOMTR-MCNC: 171 MG/DL — HIGH (ref 70–99)
GLUCOSE BLDC GLUCOMTR-MCNC: 268 MG/DL — HIGH (ref 70–99)
GLUCOSE BLDC GLUCOMTR-MCNC: 272 MG/DL — HIGH (ref 70–99)
GLUCOSE SERPL-MCNC: 190 MG/DL — HIGH (ref 70–99)
HCT VFR BLD CALC: 36.8 % — SIGNIFICANT CHANGE UP (ref 34.5–45)
HGB BLD-MCNC: 12.5 G/DL — SIGNIFICANT CHANGE UP (ref 11.5–15.5)
MCHC RBC-ENTMCNC: 30.3 PG — SIGNIFICANT CHANGE UP (ref 27–34)
MCHC RBC-ENTMCNC: 34 GM/DL — SIGNIFICANT CHANGE UP (ref 32–36)
MCV RBC AUTO: 89.1 FL — SIGNIFICANT CHANGE UP (ref 80–100)
PLATELET # BLD AUTO: 185 K/UL — SIGNIFICANT CHANGE UP (ref 150–400)
POTASSIUM SERPL-MCNC: 3.2 MMOL/L — LOW (ref 3.5–5.3)
POTASSIUM SERPL-SCNC: 3.2 MMOL/L — LOW (ref 3.5–5.3)
RBC # BLD: 4.13 M/UL — SIGNIFICANT CHANGE UP (ref 3.8–5.2)
RBC # FLD: 11.9 % — SIGNIFICANT CHANGE UP (ref 10.3–14.5)
SODIUM SERPL-SCNC: 142 MMOL/L — SIGNIFICANT CHANGE UP (ref 135–145)
SPECIMEN SOURCE: SIGNIFICANT CHANGE UP
WBC # BLD: 5.94 K/UL — SIGNIFICANT CHANGE UP (ref 3.8–10.5)
WBC # FLD AUTO: 5.94 K/UL — SIGNIFICANT CHANGE UP (ref 3.8–10.5)

## 2024-03-03 PROCEDURE — 99233 SBSQ HOSP IP/OBS HIGH 50: CPT

## 2024-03-03 PROCEDURE — 99232 SBSQ HOSP IP/OBS MODERATE 35: CPT | Mod: GC

## 2024-03-03 RX ORDER — SODIUM CHLORIDE 9 MG/ML
1000 INJECTION INTRAMUSCULAR; INTRAVENOUS; SUBCUTANEOUS
Refills: 0 | Status: DISCONTINUED | OUTPATIENT
Start: 2024-03-03 | End: 2024-03-04

## 2024-03-03 RX ORDER — INSULIN GLARGINE 100 [IU]/ML
7 INJECTION, SOLUTION SUBCUTANEOUS AT BEDTIME
Refills: 0 | Status: DISCONTINUED | OUTPATIENT
Start: 2024-03-03 | End: 2024-03-04

## 2024-03-03 RX ORDER — POTASSIUM CHLORIDE 20 MEQ
40 PACKET (EA) ORAL EVERY 4 HOURS
Refills: 0 | Status: COMPLETED | OUTPATIENT
Start: 2024-03-03 | End: 2024-03-03

## 2024-03-03 RX ORDER — DIPHENHYDRAMINE HCL 50 MG
25 CAPSULE ORAL ONCE
Refills: 0 | Status: COMPLETED | OUTPATIENT
Start: 2024-03-03 | End: 2024-03-03

## 2024-03-03 RX ORDER — POTASSIUM CHLORIDE 20 MEQ
10 PACKET (EA) ORAL
Refills: 0 | Status: DISCONTINUED | OUTPATIENT
Start: 2024-03-03 | End: 2024-03-03

## 2024-03-03 RX ADMIN — Medication 2: at 07:55

## 2024-03-03 RX ADMIN — Medication 25 MILLIGRAM(S): at 10:51

## 2024-03-03 RX ADMIN — MORPHINE SULFATE 2 MILLIGRAM(S): 50 CAPSULE, EXTENDED RELEASE ORAL at 07:01

## 2024-03-03 RX ADMIN — Medication 40 MILLIEQUIVALENT(S): at 13:24

## 2024-03-03 RX ADMIN — MORPHINE SULFATE 2 MILLIGRAM(S): 50 CAPSULE, EXTENDED RELEASE ORAL at 17:15

## 2024-03-03 RX ADMIN — Medication 6: at 11:24

## 2024-03-03 RX ADMIN — MORPHINE SULFATE 2 MILLIGRAM(S): 50 CAPSULE, EXTENDED RELEASE ORAL at 18:15

## 2024-03-03 RX ADMIN — Medication 40 MILLIEQUIVALENT(S): at 10:18

## 2024-03-03 RX ADMIN — Medication 100 MILLIGRAM(S): at 05:47

## 2024-03-03 RX ADMIN — MORPHINE SULFATE 2 MILLIGRAM(S): 50 CAPSULE, EXTENDED RELEASE ORAL at 06:46

## 2024-03-03 RX ADMIN — Medication 200 MILLIGRAM(S): at 10:19

## 2024-03-03 RX ADMIN — SODIUM CHLORIDE 75 MILLILITER(S): 9 INJECTION INTRAMUSCULAR; INTRAVENOUS; SUBCUTANEOUS at 10:50

## 2024-03-03 RX ADMIN — SODIUM CHLORIDE 75 MILLILITER(S): 9 INJECTION INTRAMUSCULAR; INTRAVENOUS; SUBCUTANEOUS at 10:32

## 2024-03-03 RX ADMIN — Medication 3 MILLIGRAM(S): at 21:06

## 2024-03-03 RX ADMIN — Medication 100 MILLIGRAM(S): at 21:06

## 2024-03-03 RX ADMIN — SODIUM CHLORIDE 75 MILLILITER(S): 9 INJECTION INTRAMUSCULAR; INTRAVENOUS; SUBCUTANEOUS at 20:21

## 2024-03-03 RX ADMIN — INSULIN GLARGINE 7 UNIT(S): 100 INJECTION, SOLUTION SUBCUTANEOUS at 21:31

## 2024-03-03 RX ADMIN — PANTOPRAZOLE SODIUM 40 MILLIGRAM(S): 20 TABLET, DELAYED RELEASE ORAL at 10:19

## 2024-03-03 RX ADMIN — Medication 100 MILLIGRAM(S): at 13:21

## 2024-03-03 RX ADMIN — Medication 200 MILLIGRAM(S): at 22:59

## 2024-03-03 RX ADMIN — Medication 2: at 21:31

## 2024-03-03 NOTE — PROGRESS NOTE ADULT - ASSESSMENT
The patient is a 38y moderately obese Female with significant PMH of Hyperthyroidism poorly controlled with methimazole (diagnosed in 11/2023), PCOD ( Polycystic ovarial disease), GERD, Fibromyalgia and others presented in ED with c/o vague abdominal pain, non-bloody vomiting and nausea.  Patient says that she intermittent nausea and vomiting for more than a year, and she takes Zofran and protonix as needed.  But for last 2-3 days, she has more nausea and vomiting as well as abdominal pain.  Her abdominal pain is more marked in epigastric area, and current pain level is 5/10 in intensity, without radiation and no aggravating or alleviating factors. She also feels hot and warm all the time.  She says that she has been drinking a lot of water, and she still feels dehydrated. She denies palpitation or diarrhea.  Her appetite is not good for last 2-3 days due to pain and nausea. She says that she was diagnosed with PCOD as teenaged, and takes OCP intermittently. She was diagnosed hyperthyroidism in last November and she follows with her Endocrinologist Dr Conway with [facility]. She says that she was initially started on Methimazole 10 mg two times daily, and then it was discontinued for a month as her thyroid was ok.  Then, she had f/u with her endocrinologist about 7-10 days ago, and was started on Methimazole 2.5 mg po daily 5 days ago.  She had not taken her Methimazole yesterday (03/01/2024) due to abd pain and nausea.   Denies smoking, alcohol or substance abuse.  She takes Methimazole, Protonix and Zofran at home.      # Acute hyperglycemia with elevated acetone, likely new onset DM-2  #Non AG starvation ketosis/metabolic acidosis  #Nausea and vomiting 2/2 to enteritis  - A1C 13  - add LION - per guidelines would start .1units per kg however pt still not eating and will be more conservative for now  - increase to higher corrective scale and may consider pre meal coverage once eating more  - will not consider metformin until after discharge  - add cipro and flagyl  - anti-emetics prn  - morphine prn for severe pain  -ISS with coverage.  -Accu-checks q 4-6 hours.  -Diabetic education.  -ICU consult note reviewed and appreciated.    # Uncontrolled hyperthyroidism, not in thyroid storm.  -TSH low at <0.01 and FT4 elevated at 2.39. FT3 pending.  No prior level for comparison.  - pt missed several doses of methimazole  - methimazole was discontinued for a month pta and recently restarted 5 days prior to illness  -Will increase her Methimazole to 5 mg po twice daily.  -Repeat TFTs in 4-6 weeks, and adjust the dose of Methimazole.  -Unfortunately, we do not have inpatient endocrinology service here in .  -F/u as outpatient with her endocrinology upon discharge.    # Epigastric abdominal pain likely due to combination of enteritis, possible PUD, and adnexal cysts.  -Protonix 40 mg IV daily.  -Morphine prn.  -OB/Gyn consult note reviewed and appreciated. NTD, findings c/w PCOS    # Leukocytosis, 2/2 to intravascular volume depletion/enteritis  -Has no respiratory symptoms. Afebrile.  -UA and RVP negative.    # DVT prophylaxis: Lovenox sq daily.    Plan of care d/w the patient and mother in detail at bedside, and she verbalized understanding. She is upset that she requires inpt stay and states she will not stay for long. All r/b/a explained to patient should she wish to sign out AMA - they understand if she decides to leave ama, her condition may worsen and result in further illness and even death.     
  # Acute hyperglycemia with elevated acetone, lnew onset DM-2.  #Starvation ketosis  #Intravascular volume depletion  -Not in DKA. No AG  -, 268  - ISS  - Lantus 5, can increase to 7 units tonite  - will need diabetic education  -Follow A1c level.: 13  - endo follow up as o/p with Dr mills    # Uncontrolled hyperthyroidism, not in thyroid storm.  -TSH low at <0.01 and FT4 elevated at 2.39. FT3 pending.  No prior level for comparison.  -Will increase her Methimazole to 5 mg po twice daily.  -Repeat TFTs in 4-6 weeks, and adjust the dose of Methimazole.  -Unfortunately, we do not have inpatient endocrinology service here in .  -F/u as outpatient with her endocrinology upon discharge.    # Epigastric abdominal pain likely due to combination of enteritis, possible PUD, and adnexal cysts.  -Protonix 40 mg IV daily.  -cipro/flagyl  -increase diet as tolerted  -Morphine prn.  -OB/Gyn consult note reviewed and appreciated. NTD, findings are c/w PCOS      # DVT prophylaxis: Lovenox sq daily.    Plan of care d/w the patient in detail at bedside, and she verbalized understanding.

## 2024-03-04 ENCOUNTER — TRANSCRIPTION ENCOUNTER (OUTPATIENT)
Age: 39
End: 2024-03-04

## 2024-03-04 LAB
GLUCOSE BLDC GLUCOMTR-MCNC: 134 MG/DL — HIGH (ref 70–99)
GLUCOSE BLDC GLUCOMTR-MCNC: 150 MG/DL — HIGH (ref 70–99)
GLUCOSE BLDC GLUCOMTR-MCNC: 156 MG/DL — HIGH (ref 70–99)
GLUCOSE BLDC GLUCOMTR-MCNC: 239 MG/DL — HIGH (ref 70–99)

## 2024-03-04 PROCEDURE — 99233 SBSQ HOSP IP/OBS HIGH 50: CPT

## 2024-03-04 RX ORDER — PANTOPRAZOLE SODIUM 20 MG/1
40 TABLET, DELAYED RELEASE ORAL
Refills: 0 | Status: DISCONTINUED | OUTPATIENT
Start: 2024-03-04 | End: 2024-03-05

## 2024-03-04 RX ORDER — INSULIN LISPRO 100/ML
2 VIAL (ML) SUBCUTANEOUS
Refills: 0 | Status: DISCONTINUED | OUTPATIENT
Start: 2024-03-04 | End: 2024-03-05

## 2024-03-04 RX ORDER — INSULIN GLARGINE 100 [IU]/ML
10 INJECTION, SOLUTION SUBCUTANEOUS AT BEDTIME
Refills: 0 | Status: DISCONTINUED | OUTPATIENT
Start: 2024-03-04 | End: 2024-03-05

## 2024-03-04 RX ORDER — HYDROMORPHONE HYDROCHLORIDE 2 MG/ML
2 INJECTION INTRAMUSCULAR; INTRAVENOUS; SUBCUTANEOUS
Refills: 0 | Status: DISCONTINUED | OUTPATIENT
Start: 2024-03-04 | End: 2024-03-05

## 2024-03-04 RX ADMIN — Medication 200 MILLIGRAM(S): at 22:25

## 2024-03-04 RX ADMIN — Medication 4: at 12:09

## 2024-03-04 RX ADMIN — PANTOPRAZOLE SODIUM 40 MILLIGRAM(S): 20 TABLET, DELAYED RELEASE ORAL at 11:02

## 2024-03-04 RX ADMIN — Medication 100 MILLIGRAM(S): at 21:07

## 2024-03-04 RX ADMIN — Medication 200 MILLIGRAM(S): at 12:08

## 2024-03-04 RX ADMIN — Medication 2 UNIT(S): at 16:56

## 2024-03-04 RX ADMIN — HYDROMORPHONE HYDROCHLORIDE 2 MILLIGRAM(S): 2 INJECTION INTRAMUSCULAR; INTRAVENOUS; SUBCUTANEOUS at 16:23

## 2024-03-04 RX ADMIN — HYDROMORPHONE HYDROCHLORIDE 2 MILLIGRAM(S): 2 INJECTION INTRAMUSCULAR; INTRAVENOUS; SUBCUTANEOUS at 15:23

## 2024-03-04 RX ADMIN — INSULIN GLARGINE 10 UNIT(S): 100 INJECTION, SOLUTION SUBCUTANEOUS at 21:07

## 2024-03-04 RX ADMIN — Medication 100 MILLIGRAM(S): at 13:12

## 2024-03-04 RX ADMIN — ONDANSETRON 4 MILLIGRAM(S): 8 TABLET, FILM COATED ORAL at 16:57

## 2024-03-04 RX ADMIN — SODIUM CHLORIDE 75 MILLILITER(S): 9 INJECTION INTRAMUSCULAR; INTRAVENOUS; SUBCUTANEOUS at 05:52

## 2024-03-04 RX ADMIN — Medication 3 MILLIGRAM(S): at 21:08

## 2024-03-04 RX ADMIN — Medication 100 MILLIGRAM(S): at 05:51

## 2024-03-04 NOTE — PHARMACOTHERAPY INTERVENTION NOTE - COMMENTS
38y female admitted with Abdominal pain    HPI:  38 year old female with a PMHx of obesity, hyperthyroidism [Diagnosed 11/2023, Poorly controlled with methimazole], PCOS, GERD, Fibromyalgia and others presents with epigastric pain, non-bloody vomiting and nausea. She has had intermittent nausea and vomiting x 1 year for which she takes Zofran and Protonix as needed, but she has had more nausea and vomiting accompanied with abdominal pain over the last 2-3 days. She reports that her current pain level is 5/10 in intensity, without radiation and no aggravating or alleviating factors. She feels hot and warm all the time. She says that she has been drinking a lot of water but still feels dehydrated. (-) Palpitation or diarrhea (+) Poor appetite x 2-3 days due to pain and nausea. She was initially started on Methimazole 10 mg two times daily in November but it was discontinued as her thyroid was ok. She then had a follow up with her endocrinologist about 7-10 days ago, and was restarted on Methimazole 2.5 mg po daily. (02 Mar 2024 04:30)    Pertinent PMH/PSH  Asthma  Fibromyalgia  Primary hyperthyroidism  Moderate obesity  Polycystic ovary disease  GERD (gastroesophageal reflux disease)  S/P cholecystectomy    Home Medications:  methIMAzole 5 mg oral tablet: 0.5 tab(s) orally once a day (02 Mar 2024 09:43)  ondansetron 4 mg oral tablet: 1 tab(s) orally 3 times a day as needed for  nausea (02 Mar 2024 09:43)  pantoprazole 40 mg oral delayed release tablet: 1 tab(s) orally once a day (02 Mar 2024 09:43)    A1C Result(s) Within Prior 3 Months  A1C with Estimated Average Glucose Result: 13.4 % (03-01-24 @ 00:18)    Renal Function Within Prior 72 Hours  Creatinine: 0.73 mg/dL (03-01-24 @ 15:19)  Creatinine: 0.56 mg/dL (03-02-24 @ 06:58)  Creatinine: 0.40 mg/dL (03-03-24 @ 06:38)    Current Orders  insulin glargine Injectable (LANTUS) 7 Unit(s) SubCutaneous at bedtime  insulin lispro (ADMELOG) corrective regimen sliding scale   SubCutaneous at bedtime  insulin lispro (ADMELOG) corrective regimen sliding scale   SubCutaneous three times a day before meals  insulin lispro Injectable (ADMELOG) 2 Unit(s) SubCutaneous three times a day before meals    POCT Within Prior 24 Hours  POCT Blood Glucose.: 171 mg/dL (03-03-24 @ 07:47)  POCT Blood Glucose.: 268 mg/dL (03-03-24 @ 11:21)  POCT Blood Glucose.: 129 mg/dL (03-03-24 @ 16:28)  POCT Blood Glucose.: 272 mg/dL (03-03-24 @ 21:28)  POCT Blood Glucose.: 150 mg/dL (03-04-24 @ 07:40)  POCT Blood Glucose.: 239 mg/dL (03-04-24 @ 11:59)    Insulin Administration Within Prior 24 Hours  insulin glargine Injectable (LANTUS)   7 Unit(s) SubCutaneous (03-03-24 @ 21:31)  insulin lispro (ADMELOG) corrective regimen sliding scale   2 Unit(s) SubCutaneous (03-03-24 @ 07:55)   6 Unit(s) SubCutaneous (03-03-24 @ 11:24)   2 Unit(s) SubCutaneous (03-03-24 @ 21:31)   4 Unit(s) SubCutaneous (03-04-24 @ 12:09)    Other Administration(s) (i.e. Steroids, PO diabetes medications) Within Prior 24 Hours  ciprofloxacin   IVPB in 200 mL D5W   200 mL/Hr IV Intermittent (03-03-24 @ 22:59)   200 mL/Hr IV Intermittent (03-04-24 @ 12:08)    Height (cm): 162.6 (03-02-24 @ 11:00)  Weight (kg): 98.5 (03-02-24 @ 11:00)  BMI (kg/m2): 37.3 (03-02-24 @ 11:00)    Current Diet  Diet, Consistent Carbohydrate w/Evening Snack (03-02-24 @ 07:52) [Active]    Assessment/Plan:  - Patient with a new onset Type 2 Diabetes Mellitus: Glycemic control to be managed by Inpatient Diabetes Management Team  - Patient follows Dr. Conway [Endocrinology] at [facility] outpatient for hyperthyroidism  - A1C is 13.4%: Patients treatment goal is A1C < 7.0% per the ADA standards and thus patient has poor glycemic control outpatient, not on anti-hyperglycemic medications at home  - Patient is insulin naive, currently ordered a moderate intensity insulin correction scale TID AC and HS and Lantus 7 units HS  - Fasting POCT 150 [Within protocol range] and pre-lunch POCT 239 [Above protocol range]  - Prior day POCT trended 171, 268, 129, 272: Patient has required insulin correction 2/6/0/2 in addition to Lantus 7 units HS  - Will start Admelog 2 units TID AC [First dose pre-dinner] and continue Lantus 7 units HS and moderate intensity insulin correction scale to titrate therapy to glycemic POCT target 100-180  - Patient is not a candidate for Tradjenta in-patient use due to A1C > 7.5%  - Plan for discharge: Would consider discharging patient with basal insulin and metformin for glycemic management and PCOS

## 2024-03-04 NOTE — PROGRESS NOTE ADULT - SUBJECTIVE AND OBJECTIVE BOX
The patient is a 38y moderately obese Female with significant PMH of Hyperthyroidism poorly controlled with methimazole (diagnosed in 11/2023), PCOD ( Polycystic ovarial disease), GERD, Fibromyalgia and others presented in ED with c/o vague abdominal pain, non-bloody vomiting and nausea.  Patient says that she intermittent nausea and vomiting for more than a year, and she takes Zofran and protonix as needed.  But for last 2-3 days, she has more nausea and vomiting as well as abdominal pain.  Her abdominal pain is more marked in epigastric area, and current pain level is 5/10 in intensity, without radiation and no aggravating or alleviating factors. She also feels hot and warm all the time.  She says that she has been drinking a lot of water, and she still feels dehydrated. She denies palpitation or diarrhea.  Her appetite is not good for last 2-3 days due to pain and nausea. She says that she was diagnosed with PCOD as teenaged, and takes OCP intermittently. She was diagnosed hyperthyroidism in last November and she follows with her Endocrinologist Dr Conway with [facility]. She says that she was initially started on Methimazole 10 mg two times daily, and then it was discontinued for a month as her thyroid was ok.  Then, she had f/u with her endocrinologist about 7-10 days ago, and was started on Methimazole 2.5 mg po daily 5 days ago.  She had not taken her Methimazole yesterday (03/01/2024) due to abd pain and nausea.   Denies smoking, alcohol or substance abuse.  She takes Methimazole, Protonix and Zofran at home.    Sig labs: WBC 13.23k with N 82%, Lactate 3.2->1.6, Glucose 375, Bicarb 23, AG 11, Acetone moderate, TSH low <0.01, FT4 high 2.39, FT3 pending, Lipase normal at 18.   UA negative.  RVP negative.  A1c pending.  CT abd/pelvis: Positive for Enteritis, Right hydrosalpinx/pyosalpinx and Left adenexal cyst likely hemorrhagic.    US pelvis transabdominal: B/L adenexal cystic areas like in CT abd.pelvis.    Seen by OBG/GYN in ED: Consistent with h/o PCOD. No acute intervention, and f/u as outpatient.  Seen by ICU in ED: Not in DKA or thyroid storm, declined for medicine admission.    IV fluid NS 4 L bolus, regular insulin 4 units sq, Dilaudid IV 3 doses and Zofran given in ED.        sub: mother and friend at bedside. Pt endorses epigastric pain however improved and willing to have trial of PO. Labs improved        IICU Vital Signs Last 24 Hrs  T(C): 36.8 (02 Mar 2024 10:43), Max: 36.9 (01 Mar 2024 14:26)  T(F): 98.2 (02 Mar 2024 10:43), Max: 98.5 (01 Mar 2024 14:26)  HR: 106 (02 Mar 2024 10:43) (93 - 129)  BP: 146/87 (02 Mar 2024 10:43) (131/84 - 146/87)  BP(mean): 108 (02 Mar 2024 09:52) (100 - 108)  ABP: --  ABP(mean): --  RR: 18 (02 Mar 2024 10:43) (16 - 20)  SpO2: 100% (02 Mar 2024 10:43) (95% - 100%)    O2 Parameters below as of 02 Mar 2024 10:43  Patient On (Oxygen Delivery Method): room air            PHYSICAL EXAM:    Constitutional: NAD, awake and alert,   HEENT: PERR, EOMI, Normal Hearing, MMM  Neck: Soft and supple, No LAD, No JVD  Respiratory: Breath sounds are clear bilaterally, No wheezing, rales or rhonchi  Cardiovascular: S1 and S2, regular rate and rhythm, no Murmurs, gallops or rubs  Gastrointestinal: Bowel Sounds present, soft, nontender, nondistended, no guarding, no rebound  Extremities: No peripheral edema  Vascular: 2+ peripheral pulses  Neurological: A/O x 3, no focal deficits  Musculoskeletal: 5/5 strength b/l upper and lower extremities  Skin: No rashes          LABS: All Labs Reviewed:                        13.8   10.24 )-----------( 231      ( 02 Mar 2024 06:58 )             40.7     03-02    137  |  109<H>  |  8   ----------------------------<  239<H>  3.5   |  21<L>  |  0.56    Ca    8.9      02 Mar 2024 06:58    TPro  8.3  /  Alb  4.1  /  TBili  0.9  /  DBili  x   /  AST  16  /  ALT  30  /  AlkPhos  153<H>  03-01    PT/INR - ( 01 Mar 2024 15:19 )   PT: 10.7 sec;   INR: 0.95 ratio         PTT - ( 01 Mar 2024 15:19 )  PTT:29.3 sec          Blood Culture:     RADIOLOGY/EKG: reviewed        
Patient is a 38y old  Female who presents with a chief complaint of Acute hyperglycemia and uncontrolled hyperthyroidism. (02 Mar 2024 13:02)      SUBJECTIVE:   HPI:  Chief Complaint: abdominal pain.    The patient is a 38y moderately obese Female with significant PMH of Hyperthyroidism poorly controlled with methimazole (diagnosed in 11/2023), PCOD ( Polycystic ovarial disease), GERD, Fibromyalgia and others presented in ED with c/o vague abdominal pain, non-bloody vomiting and nausea.  Patient says that she intermittent nausea and vomiting for more than a year, and she takes Zofran and protonix as needed.  But for last 2-3 days, she has more nausea and vomiting as well as abdominal pain.  Her abdominal pain is more marked in epigastric area, and current pain level is 5/10 in intensity, without radiation and no aggravating or alleviating factors. She also feels hot and warm all the time.  She says that she has been drinking a lot of water, and she still feels dehydrated. She denies palpitation or diarrhea.  Her appetite is not good for last 2-3 days due to pain and nausea. She says that she was diagnosed with PCOD as teenaged, and takes OCP intermittently. She was diagnosed hyperthyroidism in last November and she follows with her Endocrinologist Dr Conway with [facility]. She says that she was initially started on Methimazole 10 mg two times daily, and then it was discontinued for a month as her thyroid was ok.  Then, she had f/u with her endocrinologist about 7-10 days ago, and was started on Methimazole 2.5 mg po daily 5 days ago.  She had not taken her Methimazole yesterday (03/01/2024) due to abd pain and nausea.   Denies smoking, alcohol or substance abuse.  She takes Methimazole, Protonix and Zofran at home.    Sig labs: WBC 13.23k with N 82%, Lactate 3.2->1.6, Glucose 375, Bicarb 23, AG 11, Acetone moderate, TSH low <0.01, FT4 high 2.39, FT3 pending, Lipase normal at 18.   UA negative.  RVP negative.  A1c pending.  CT abd/pelvis: Positive for Enteritis, Right hydrosalpinx/pyosalpinx and Left adenexal cyst likely hemorrhagic.    US pelvis transabdominal: B/L adenexal cystic areas like in CT abd.pelvis.    Seen by OBG/GYN in ED: Consistent with h/o PCOD. No acute intervention, and f/u as outpatient.  Seen by ICU in ED: Not in DKA or thyroid storm, declined for medicine admission.    IV fluid NS 4 L bolus, regular insulin 4 units sq, Dilaudid IV 3 doses and Zofran given in ED.     (02 Mar 2024 04:30)          sub: pt still endorses epigastric pain and some nausea. No longer vomiting.             Vital Signs Last 24 Hrs  T(C): 36.7 (03 Mar 2024 08:45), Max: 36.7 (03 Mar 2024 08:45)  T(F): 98.1 (03 Mar 2024 08:45), Max: 98.1 (03 Mar 2024 08:45)  HR: 90 (03 Mar 2024 08:45) (90 - 100)  BP: 112/80 (03 Mar 2024 08:45) (103/55 - 117/81)  BP(mean): --  RR: 18 (03 Mar 2024 08:45) (18 - 18)  SpO2: 100% (03 Mar 2024 08:45) (100% - 100%)    Parameters below as of 03 Mar 2024 08:45  Patient On (Oxygen Delivery Method): room air        I&O's Summary      CAPILLARY BLOOD GLUCOSE      POCT Blood Glucose.: 268 mg/dL (03 Mar 2024 11:21)  POCT Blood Glucose.: 171 mg/dL (03 Mar 2024 07:47)  POCT Blood Glucose.: 288 mg/dL (02 Mar 2024 21:33)  POCT Blood Glucose.: 233 mg/dL (02 Mar 2024 16:41)      PHYSICAL EXAM:    Constitutional: NAD, awake and alert,   HEENT: PERR, EOMI, Normal Hearing, MMM  Neck: Soft and supple, No LAD, No JVD  Respiratory: Breath sounds are clear bilaterally, No wheezing, rales or rhonchi  Cardiovascular: S1 and S2, regular rate and rhythm, no Murmurs, gallops or rubs  Gastrointestinal: Bowel Sounds present, soft, nontender, nondistended, no guarding, no rebound  Extremities: No peripheral edema  Vascular: 2+ peripheral pulses  Neurological: A/O x 3, no focal deficits  Musculoskeletal: 5/5 strength b/l upper and lower extremities  Skin: No rashes    MEDICATIONS:  MEDICATIONS  (STANDING):  ciprofloxacin   IVPB 400 milliGRAM(s) IV Intermittent every 12 hours  dextrose 5%. 1000 milliLiter(s) (100 mL/Hr) IV Continuous <Continuous>  dextrose 5%. 1000 milliLiter(s) (50 mL/Hr) IV Continuous <Continuous>  dextrose 50% Injectable 25 Gram(s) IV Push once  dextrose 50% Injectable 25 Gram(s) IV Push once  dextrose 50% Injectable 12.5 Gram(s) IV Push once  enoxaparin Injectable 40 milliGRAM(s) SubCutaneous every 24 hours  glucagon  Injectable 1 milliGRAM(s) IntraMuscular once  insulin glargine Injectable (LANTUS) 5 Unit(s) SubCutaneous at bedtime  insulin lispro (ADMELOG) corrective regimen sliding scale   SubCutaneous at bedtime  insulin lispro (ADMELOG) corrective regimen sliding scale   SubCutaneous three times a day before meals  methimazole 5 milliGRAM(s) Oral daily  methimazole 5 milliGRAM(s) Oral at bedtime  metroNIDAZOLE  IVPB      metroNIDAZOLE  IVPB 500 milliGRAM(s) IV Intermittent every 8 hours  pantoprazole  Injectable 40 milliGRAM(s) IV Push daily  potassium chloride    Tablet ER 40 milliEquivalent(s) Oral every 4 hours  sodium chloride 0.9%. 1000 milliLiter(s) (75 mL/Hr) IV Continuous <Continuous>      LABS: All Labs Reviewed:                        12.5   5.94  )-----------( 185      ( 03 Mar 2024 06:38 )             36.8     03-03    142  |  112<H>  |  7   ----------------------------<  190<H>  3.2<L>   |  24  |  0.40<L>    Ca    8.3<L>      03 Mar 2024 06:38    TPro  8.3  /  Alb  4.1  /  TBili  0.9  /  DBili  x   /  AST  16  /  ALT  30  /  AlkPhos  153<H>  03-01    PT/INR - ( 01 Mar 2024 15:19 )   PT: 10.7 sec;   INR: 0.95 ratio         PTT - ( 01 Mar 2024 15:19 )  PTT:29.3 sec          Blood Culture: 03-01 @ 16:14  Organism --  Gram Stain Blood -- Gram Stain --  Specimen Source Clean Catch Clean Catch (Midstream)  Culture-Blood --        RADIOLOGY/EKG: reviewed        
Patient is a 38y old  Female who presents with a chief complaint of Acute hyperglycemia and uncontrolled hyperthyroidism. (02 Mar 2024 13:02)      SUBJECTIVE:   HPI:  Chief Complaint: abdominal pain.    37 yo moderately obese Female with significant PMH of Hyperthyroidism on methimazole (diagnosed in 11/2023), PCO ( Polycystic ovarial disease), GERD, Fibromyalgia and others presented in ED with c/o vague abdominal pain, non-bloody vomiting and nausea.  Patient says that she intermittent nausea and vomiting for more than a year, and she takes Zofran and protonix as needed.  But for last 2-3 days, she has more nausea and vomiting as well as abdominal pain.  Her abdominal pain is more marked in epigastric area, and current pain level is 5/10 in intensity, without radiation and no aggravating or alleviating factors. She also feels hot and warm all the time.  She says that she has been drinking a lot of water, and she still feels dehydrated. She denies palpitation or diarrhea.  Her appetite is not good for last 2-3 days due to pain and nausea. She says that she was diagnosed with PCOD as teenaged, and takes OCP intermittently. She was diagnosed hyperthyroidism in last November and she follows with her Endocrinologist Dr Conway with [facility]. She says that she was initially started on Methimazole 10 mg two times daily, and then it was discontinued for a month as her thyroid was ok.  Then, she had f/u with her endocrinologist about 7-10 days ago, and was started on Methimazole 2.5 mg po daily 5 days ago.  She had not taken her Methimazole yesterday (03/01/2024) due to abd pain and nausea.   Denies smoking, alcohol or substance abuse.  She takes Methimazole, Protonix and Zofran at home.  -found to have enteritis and new onset DM , HbA1c 13    CT abd/pelvis: Positive for Enteritis, Right hydrosalpinx/pyosalpinx and Left adnexal cyst likely hemorrhagic.  Seen by OBG/GYN in ED: Consistent with h/o PCOD. No acute intervention, and f/u as outpatient.      3.4: no vomiting, no diarrhea, no fever/chills, c/o epigastric pain 6/10, intermittent, c/o nausea   Patient states that she would like to be discharged today despite having multiple concerns about her DM, abd pain, thyroid disease.          REVIEW OF SYSTEMS:    CONSTITUTIONAL: No weakness, No fevers or chills  ENT: No ear ache, No sorethroat  NECK: No pain, No stiffness  RESPIRATORY: No cough, No wheezing, No hemoptysis; No dyspnea  CARDIOVASCULAR: No chest pain, No palpitations  GASTROINTESTINAL: No abd pain, +nausea, No vomiting, No hematemesis, No diarrhea or constipation. No melena, No hematochezia.  GENITOURINARY: No dysuria, No  hematuria  NEUROLOGICAL: No diplopia, No paresthesia, No motor dysfunction  MUSCULOSKELETAL: No arthralgia, No myalgia  SKIN: No rashes, or lesions   PSYCH: no anxiety, no suicidal ideation    All other review of systems is negative unless indicated above    Vital Signs Last 24 Hrs  T(C): 36.9 (04 Mar 2024 07:26), Max: 36.9 (04 Mar 2024 07:26)  T(F): 98.4 (04 Mar 2024 07:26), Max: 98.4 (04 Mar 2024 07:26)  HR: 86 (04 Mar 2024 07:26) (86 - 93)  BP: 129/83 (04 Mar 2024 07:26) (104/53 - 129/83)  RR: 18 (04 Mar 2024 07:26) (18 - 18)  SpO2: 100% (04 Mar 2024 07:26) (100% - 100%)    Parameters below as of 04 Mar 2024 07:26  Patient On (Oxygen Delivery Method): room air        PHYSICAL EXAM:    GENERAL: NAD  HEENT:  NC/AT, EOMI, PERRLA, No scleral icterus, Moist mucous membranes  NECK: Supple, No JVD  CNS:  Alert & Oriented X3, Motor Strength 5/5 B/L upper and lower extremities; DTRs 2+ intact   LUNG: Normal Breath sounds, Clear to auscultation bilaterally, No rales, No rhonchi, No wheezing  HEART: RRR; No murmurs, No rubs  ABDOMEN: +BS, ST/ND, +TTP to epigastric area   GENITOURINARY: Voiding, Bladder not distended  EXTREMITIES:  2+ Peripheral Pulses, No clubbing, No cyanosis, No tibial edema  MUSCULOSKELTAL: Joints normal ROM, No TTP, No effusion  VAGINAL: deferred  SKIN: no rashes  RECTAL: deferred, not indicated  BREAST: deferred                          12.5   5.94  )-----------( 185      ( 03 Mar 2024 06:38 )             36.8     03-03    142  |  112<H>  |  7   ----------------------------<  190<H>  3.2<L>   |  24  |  0.40<L>    Ca    8.3<L>      03 Mar 2024 06:38      :     MEDICATIONS  (STANDING):  ciprofloxacin   IVPB 400 milliGRAM(s) IV Intermittent every 12 hours  enoxaparin Injectable 40 milliGRAM(s) SubCutaneous every 24 hours  glucagon  Injectable 1 milliGRAM(s) IntraMuscular once  insulin glargine Injectable (LANTUS) 10 Unit(s) SubCutaneous at bedtime  methimazole 5 milliGRAM(s) Oral daily  methimazole 5 milliGRAM(s) Oral at bedtime  metroNIDAZOLE  IVPB 500 milliGRAM(s) IV Intermittent every 8 hours  pantoprazole  Injectable 40 milliGRAM(s) IV Push daily  sodium chloride 0.9%. 1000 milliLiter(s) (75 mL/Hr) IV Continuous <Continuous>    MEDICATIONS  (PRN):  acetaminophen     Tablet .. 650 milliGRAM(s) Oral every 6 hours PRN Temp greater or equal to 38C (100.4F), Mild Pain (1 - 3)  aluminum hydroxide/magnesium hydroxide/simethicone Suspension 30 milliLiter(s) Oral every 4 hours PRN Dyspepsia  dextrose Oral Gel 15 Gram(s) Oral once PRN Blood Glucose LESS THAN 70 milliGRAM(s)/deciliter  melatonin 3 milliGRAM(s) Oral at bedtime PRN Insomnia  metoclopramide Injectable 10 milliGRAM(s) IV Push every 8 hours PRN N+V  ondansetron Injectable 4 milliGRAM(s) IV Push every 6 hours PRN Nausea and/or Vomiting      all labs reviewed  all imaging reviewed    a/p:      1. New onset DM:  not controlled  Increase Insulin Lantus to 10u qHS  ISS  ADA  Metformin as outpatient  Diabetic education, Nutrition evaluation  Patient needs to be instructed on how to use Glucometer and self administer Insulin  Explained to patient that she is not ready for discharge today and that her Insulin requirements are not clear yet    2.  Uncontrolled hyperthyroidism, not in thyroid storm.  -TSH low at <0.01 and FT4 elevated at 2.39.   -will increase Methimazole to 2.5mg po BID; outpatient follow up w endocrinology in ~ 2weeks    3. Epigastric abdominal pain likely due to combination of enteritis, possible PUD, and possible gastroparesis   -on Protonix 40 mg IV daily, will change to PO  -cipro/flagyl day#2  -Tramadol prn    4. PCOS  -OB/Gyn consult note reviewed and appreciated. NTD, findings are c/w PCOS  start Metformin as outpatient to target DM and PCOS    All the above medical issues discussed in detail with patient and all questions answered; patient still wants to be discharged home today so I explained to her that this would be AMA  conversation witnessed by KALPANA Matson

## 2024-03-04 NOTE — PROGRESS NOTE ADULT - REASON FOR ADMISSION
Acute hyperglycemia and uncontrolled hyperthyroidism.

## 2024-03-04 NOTE — DISCHARGE NOTE NURSING/CASE MANAGEMENT/SOCIAL WORK - PATIENT PORTAL LINK FT
You can access the FollowMyHealth Patient Portal offered by [facility] by registering at the following website: [email or web address]. By joining [facility]’s FollowMyHealth portal, you will also be able to view your health information using other applications (apps) compatible with our system.

## 2024-03-05 ENCOUNTER — TRANSCRIPTION ENCOUNTER (OUTPATIENT)
Age: 39
End: 2024-03-05

## 2024-03-05 VITALS
OXYGEN SATURATION: 99 % | HEART RATE: 84 BPM | TEMPERATURE: 98 F | DIASTOLIC BLOOD PRESSURE: 48 MMHG | RESPIRATION RATE: 17 BRPM | SYSTOLIC BLOOD PRESSURE: 98 MMHG

## 2024-03-05 LAB
ANION GAP SERPL CALC-SCNC: 6 MMOL/L — SIGNIFICANT CHANGE UP (ref 5–17)
BUN SERPL-MCNC: 5 MG/DL — LOW (ref 7–23)
CALCIUM SERPL-MCNC: 8.5 MG/DL — SIGNIFICANT CHANGE UP (ref 8.5–10.1)
CHLORIDE SERPL-SCNC: 111 MMOL/L — HIGH (ref 96–108)
CO2 SERPL-SCNC: 24 MMOL/L — SIGNIFICANT CHANGE UP (ref 22–31)
CREAT SERPL-MCNC: 0.51 MG/DL — SIGNIFICANT CHANGE UP (ref 0.5–1.3)
EGFR: 124 ML/MIN/1.73M2 — SIGNIFICANT CHANGE UP
GLUCOSE BLDC GLUCOMTR-MCNC: 114 MG/DL — HIGH (ref 70–99)
GLUCOSE BLDC GLUCOMTR-MCNC: 117 MG/DL — HIGH (ref 70–99)
GLUCOSE BLDC GLUCOMTR-MCNC: 151 MG/DL — HIGH (ref 70–99)
GLUCOSE SERPL-MCNC: 124 MG/DL — HIGH (ref 70–99)
POTASSIUM SERPL-MCNC: 3.8 MMOL/L — SIGNIFICANT CHANGE UP (ref 3.5–5.3)
POTASSIUM SERPL-SCNC: 3.8 MMOL/L — SIGNIFICANT CHANGE UP (ref 3.5–5.3)
SODIUM SERPL-SCNC: 141 MMOL/L — SIGNIFICANT CHANGE UP (ref 135–145)

## 2024-03-05 PROCEDURE — 99239 HOSP IP/OBS DSCHRG MGMT >30: CPT

## 2024-03-05 RX ORDER — CIPROFLOXACIN LACTATE 400MG/40ML
1 VIAL (ML) INTRAVENOUS
Qty: 8 | Refills: 0
Start: 2024-03-05 | End: 2024-03-08

## 2024-03-05 RX ORDER — INSULIN GLARGINE 100 [IU]/ML
10 INJECTION, SOLUTION SUBCUTANEOUS
Qty: 5 | Refills: 0
Start: 2024-03-05 | End: 2024-04-03

## 2024-03-05 RX ORDER — ISOPROPYL ALCOHOL, BENZOCAINE .7; .06 ML/ML; ML/ML
0 SWAB TOPICAL
Qty: 100 | Refills: 1
Start: 2024-03-05

## 2024-03-05 RX ORDER — INSULIN GLARGINE 100 [IU]/ML
10 INJECTION, SOLUTION SUBCUTANEOUS
Qty: 1 | Refills: 0
Start: 2024-03-05 | End: 2024-04-03

## 2024-03-05 RX ORDER — SUCRALFATE 1 G
1 TABLET ORAL
Refills: 0 | Status: DISCONTINUED | OUTPATIENT
Start: 2024-03-05 | End: 2024-03-05

## 2024-03-05 RX ORDER — METRONIDAZOLE 500 MG
1 TABLET ORAL
Qty: 12 | Refills: 0
Start: 2024-03-05 | End: 2024-03-08

## 2024-03-05 RX ORDER — SUCRALFATE 1 G
10 TABLET ORAL
Qty: 280 | Refills: 0
Start: 2024-03-05 | End: 2024-03-11

## 2024-03-05 RX ADMIN — PANTOPRAZOLE SODIUM 40 MILLIGRAM(S): 20 TABLET, DELAYED RELEASE ORAL at 05:08

## 2024-03-05 RX ADMIN — Medication 2: at 12:17

## 2024-03-05 RX ADMIN — Medication 100 MILLIGRAM(S): at 14:38

## 2024-03-05 RX ADMIN — Medication 1 GRAM(S): at 11:11

## 2024-03-05 RX ADMIN — ONDANSETRON 4 MILLIGRAM(S): 8 TABLET, FILM COATED ORAL at 08:26

## 2024-03-05 RX ADMIN — HYDROMORPHONE HYDROCHLORIDE 2 MILLIGRAM(S): 2 INJECTION INTRAMUSCULAR; INTRAVENOUS; SUBCUTANEOUS at 05:10

## 2024-03-05 RX ADMIN — Medication 100 MILLIGRAM(S): at 05:08

## 2024-03-05 RX ADMIN — HYDROMORPHONE HYDROCHLORIDE 2 MILLIGRAM(S): 2 INJECTION INTRAMUSCULAR; INTRAVENOUS; SUBCUTANEOUS at 05:40

## 2024-03-05 RX ADMIN — Medication 2 UNIT(S): at 08:26

## 2024-03-05 RX ADMIN — Medication 200 MILLIGRAM(S): at 11:07

## 2024-03-05 NOTE — DIETITIAN INITIAL EVALUATION ADULT - NS FNS DIET ORDER
Diet, Consistent Carbohydrate w/Evening Snack:   Supplement Feeding Modality:  Oral  Glucerna Shake Cans or Servings Per Day:  1       Frequency:  Three Times a day (03-05-24 @ 09:12)

## 2024-03-05 NOTE — DIETITIAN INITIAL EVALUATION ADULT - PERTINENT LABORATORY DATA
POCT Blood Glucose.: 114 mg/dL (03-05-24 @ 08:03)  A1C with Estimated Average Glucose Result: 13.4 % (03-01-24 @ 00:18)

## 2024-03-05 NOTE — DISCHARGE NOTE PROVIDER - NSDCCPCAREPLAN_GEN_ALL_CORE_FT
PRINCIPAL DISCHARGE DIAGNOSIS  Diagnosis: Abdominal pain with vomiting  Assessment and Plan of Treatment: Admitted for abdominal pain and vomiting found to have enteritis, continue with ciprofloxacin and metronidazole for 4 more days.      SECONDARY DISCHARGE DIAGNOSES  Diagnosis: Diabetes mellitus  Assessment and Plan of Treatment: New onset diabetes, your A1c was 13%, continue with lantus 10 units at bedtime. Follow up with your endocrinologist next week. Recommend diabetic diet. If you experience any tremors, sweating, lightheadedness, blurred vision, check your glucose and if low < 80, take small amount of orange juice or some candy or have a meal.    Diagnosis: Gastritis  Assessment and Plan of Treatment: Continue with pantoprazole daily before breakfast, and carafate 4 times a day (before meals and at bedtime). Follow up with your gastroenterologist for continued management. Will eventually need an endoscopy when thyroid function normalized.    Diagnosis: Hyperthyroidism  Assessment and Plan of Treatment: Continue medication as recently prescribed by your endocrinologist. Follow up for continued management. Recheck thyroid function in 4 weeks or as indicated.    Diagnosis: Dehydration  Assessment and Plan of Treatment:     Diagnosis: DM ketosis  Assessment and Plan of Treatment:     Diagnosis: Enteritis  Assessment and Plan of Treatment:     Diagnosis: Cyst, ovarian  Assessment and Plan of Treatment:

## 2024-03-05 NOTE — DIETITIAN INITIAL EVALUATION ADULT - ADD RECOMMEND
Maintain consistent carb diet  MVI w/ minerals daily to ensure 100% RDA met   Record PO intake in EMR after each meal (nursing.)   Pt receives glucerna tid  Monitor bowel movements, if no BM for >3 days, consider implementing bowel regimen.   Pt educated on T2DM  A target glucose range of 140-180 mg/dL is recommended for most critically ill and non-critically ill patients.   Monitor PO intake, tolerance, labs and weight.

## 2024-03-05 NOTE — DIETITIAN NUTRITION RISK NOTIFICATION - TREATMENT: THE FOLLOWING DIET HAS BEEN RECOMMENDED
Diet, Consistent Carbohydrate w/Evening Snack:   Supplement Feeding Modality:  Oral  Glucerna Shake Cans or Servings Per Day:  1       Frequency:  Three Times a day (03-05-24 @ 09:12) [Active]

## 2024-03-05 NOTE — PHARMACOTHERAPY INTERVENTION NOTE - COMMENTS
38y female admitted with Abdominal pain    HPI:  38 year old female with a PMHx of obesity, hyperthyroidism [Diagnosed 11/2023, Poorly controlled with methimazole], PCOS, GERD, Fibromyalgia and others presents with epigastric pain, non-bloody vomiting and nausea. She has had intermittent nausea and vomiting x 1 year for which she takes Zofran and Protonix as needed, but she has had more nausea and vomiting accompanied with abdominal pain over the last 2-3 days. She reports that her current pain level is 5/10 in intensity, without radiation and no aggravating or alleviating factors. She feels hot and warm all the time. She says that she has been drinking a lot of water but still feels dehydrated. (-) Palpitation or diarrhea (+) Poor appetite x 2-3 days due to pain and nausea. She was initially started on Methimazole 10 mg two times daily in November but it was discontinued as her thyroid was ok. She then had a follow up with her endocrinologist about 7-10 days ago, and was restarted on Methimazole 2.5 mg po daily. (02 Mar 2024 04:30)    Pertinent PMH/PSH  Asthma  Fibromyalgia  Primary hyperthyroidism  Moderate obesity  Polycystic ovary disease  GERD (gastroesophageal reflux disease)  S/P cholecystectomy    Home Medications:  methIMAzole 5 mg oral tablet: 0.5 tab(s) orally once a day (02 Mar 2024 09:43)  ondansetron 4 mg oral tablet: 1 tab(s) orally 3 times a day as needed for  nausea (02 Mar 2024 09:43)  pantoprazole 40 mg oral delayed release tablet: 1 tab(s) orally once a day (02 Mar 2024 09:43)    A1C Result(s) Within Prior 3 Months  A1C with Estimated Average Glucose Result: 13.4 % (03-01-24 @ 00:18)    Renal Function Within Prior 72 Hours  Creatinine: 0.40 mg/dL (03-03-24 @ 06:38)  Creatinine: 0.51 mg/dL (03-05-24 @ 08:37)    Current Orders  insulin glargine Injectable (LANTUS) 10 Unit(s) SubCutaneous at bedtime  insulin lispro (ADMELOG) corrective regimen sliding scale   SubCutaneous three times a day before meals  insulin lispro (ADMELOG) corrective regimen sliding scale   SubCutaneous at bedtime    POCT Within Prior 24 Hours  POCT Blood Glucose.: 150 mg/dL (03-04-24 @ 07:40)  POCT Blood Glucose.: 239 mg/dL (03-04-24 @ 11:59)  POCT Blood Glucose.: 134 mg/dL (03-04-24 @ 16:34)  POCT Blood Glucose.: 156 mg/dL (03-04-24 @ 21:05)  POCT Blood Glucose.: 114 mg/dL (03-05-24 @ 08:03)  POCT Blood Glucose.: 151 mg/dL (03-05-24 @ 11:38)    Insulin Administration Within Prior 24 Hours  insulin glargine Injectable (LANTUS)   10 Unit(s) SubCutaneous (03-04-24 @ 21:07)  insulin lispro (ADMELOG) corrective regimen sliding scale   4 Unit(s) SubCutaneous (03-04-24 @ 12:09)  insulin lispro Injectable (ADMELOG)   2 Unit(s) SubCutaneous (03-04-24 @ 16:56)   2 Unit(s) SubCutaneous (03-05-24 @ 08:26)    Other Administration(s) (i.e. Steroids, PO diabetes medications) Within Prior 24 Hours  ciprofloxacin   IVPB in 200 mL D5W   200 mL/Hr IV Intermittent (03-04-24 @ 22:25)   200 mL/Hr IV Intermittent (03-05-24 @ 11:07)    Height (cm): 162.6 (03-02-24 @ 11:00)  Weight (kg): 98.5 (03-02-24 @ 11:00)  BMI (kg/m2): 37.3 (03-02-24 @ 11:00)    Current Diet  Diet, Consistent Carbohydrate w/Evening Snack:   Supplement Feeding Modality:  Oral  Glucerna Shake Cans or Servings Per Day:  1       Frequency:  Three Times a day (03-05-24 @ 09:12) [Active]    Assessment/Plan:  - Patient with a new onset Type 2 Diabetes Mellitus: Glycemic control to be managed by Inpatient Diabetes Management Team  - Patient follows Dr. Conway [Endocrinology] at [facility] outpatient for hyperthyroidism  - A1C is 13.4%: Patients treatment goal is A1C < 7.0% per the ADA standards and thus patient has poor glycemic control outpatient, not on anti-hyperglycemic medications at home  - Patient is insulin naive, currently ordered a moderate intensity insulin correction scale TID AC and HS, Lantus 10 units HS and Admelog 2 units TID AC  - Fasting POCT 114 and pre-lunch POCT 151: Patients glycemic control is currently within protocol range of POCT 100-180  - Prior day POCT trended 150, 239, 134, 156: Patient has required Admelog correction 0/4/0/0 in addition to Lantus 10 units HS and Admelog 0/0/2 AC  - Will continue Lantus 10 units HS, hold further pre-meal, and continue with moderate intensity insulin correction scale to titrate therapy to glycemic POCT target 100-180  - Patient is not a candidate for Tradjenta in-patient use due to A1C > 7.5%  - Plan for discharge: Patient to be discharged with basal insulin, Lantus Solostar 10 units HS, and outpatient endocrinology with Dr. Conway follow up to evaluate possibility of T1DM per MD given age of diagnosis 38y female admitted with Abdominal pain    HPI:  38 year old female with a PMHx of obesity, hyperthyroidism [Diagnosed 11/2023, Poorly controlled with methimazole], PCOS, GERD, Fibromyalgia and others presents with epigastric pain, non-bloody vomiting and nausea. She has had intermittent nausea and vomiting x 1 year for which she takes Zofran and Protonix as needed, but she has had more nausea and vomiting accompanied with abdominal pain over the last 2-3 days. She reports that her current pain level is 5/10 in intensity, without radiation and no aggravating or alleviating factors. She feels hot and warm all the time. She says that she has been drinking a lot of water but still feels dehydrated. (-) Palpitation or diarrhea (+) Poor appetite x 2-3 days due to pain and nausea. She was initially started on Methimazole 10 mg two times daily in November but it was discontinued as her thyroid was ok. She then had a follow up with her endocrinologist about 7-10 days ago, and was restarted on Methimazole 2.5 mg po daily. (02 Mar 2024 04:30)    Pertinent PMH/PSH  Asthma  Fibromyalgia  Primary hyperthyroidism  Moderate obesity  Polycystic ovary disease  GERD (gastroesophageal reflux disease)  S/P cholecystectomy    Home Medications:  methIMAzole 5 mg oral tablet: 0.5 tab(s) orally once a day (02 Mar 2024 09:43)  ondansetron 4 mg oral tablet: 1 tab(s) orally 3 times a day as needed for  nausea (02 Mar 2024 09:43)  pantoprazole 40 mg oral delayed release tablet: 1 tab(s) orally once a day (02 Mar 2024 09:43)    A1C Result(s) Within Prior 3 Months  A1C with Estimated Average Glucose Result: 13.4 % (03-01-24 @ 00:18)    Renal Function Within Prior 72 Hours  Creatinine: 0.40 mg/dL (03-03-24 @ 06:38)  Creatinine: 0.51 mg/dL (03-05-24 @ 08:37)    Current Orders  insulin glargine Injectable (LANTUS) 10 Unit(s) SubCutaneous at bedtime  insulin lispro (ADMELOG) corrective regimen sliding scale   SubCutaneous three times a day before meals  insulin lispro (ADMELOG) corrective regimen sliding scale   SubCutaneous at bedtime    POCT Within Prior 24 Hours  POCT Blood Glucose.: 150 mg/dL (03-04-24 @ 07:40)  POCT Blood Glucose.: 239 mg/dL (03-04-24 @ 11:59)  POCT Blood Glucose.: 134 mg/dL (03-04-24 @ 16:34)  POCT Blood Glucose.: 156 mg/dL (03-04-24 @ 21:05)  POCT Blood Glucose.: 114 mg/dL (03-05-24 @ 08:03)  POCT Blood Glucose.: 151 mg/dL (03-05-24 @ 11:38)    Insulin Administration Within Prior 24 Hours  insulin glargine Injectable (LANTUS)   10 Unit(s) SubCutaneous (03-04-24 @ 21:07)  insulin lispro (ADMELOG) corrective regimen sliding scale   4 Unit(s) SubCutaneous (03-04-24 @ 12:09)  insulin lispro Injectable (ADMELOG)   2 Unit(s) SubCutaneous (03-04-24 @ 16:56)   2 Unit(s) SubCutaneous (03-05-24 @ 08:26)    Other Administration(s) (i.e. Steroids, PO diabetes medications) Within Prior 24 Hours  ciprofloxacin   IVPB in 200 mL D5W   200 mL/Hr IV Intermittent (03-04-24 @ 22:25)   200 mL/Hr IV Intermittent (03-05-24 @ 11:07)    Height (cm): 162.6 (03-02-24 @ 11:00)  Weight (kg): 98.5 (03-02-24 @ 11:00)  BMI (kg/m2): 37.3 (03-02-24 @ 11:00)    Current Diet  Diet, Consistent Carbohydrate w/Evening Snack:   Supplement Feeding Modality:  Oral  Glucerna Shake Cans or Servings Per Day:  1       Frequency:  Three Times a day (03-05-24 @ 09:12) [Active]    Assessment/Plan:  - Patient with a new onset Type 2 Diabetes Mellitus: Glycemic control to be managed by Inpatient Diabetes Management Team  - Patient follows Dr. Conway [Endocrinology] at [facility] outpatient for hyperthyroidism  - A1C is 13.4%: Patients treatment goal is A1C < 7.0% per the ADA standards and thus patient has poor glycemic control outpatient, not on anti-hyperglycemic medications at home  - Patient is insulin naive, currently ordered a moderate intensity insulin correction scale TID AC and HS, Lantus 10 units HS and Admelog 2 units TID AC  - Fasting POCT 114 and pre-lunch POCT 151: Patients glycemic control is currently within protocol range of POCT 100-180  - Prior day POCT trended 150, 239, 134, 156: Patient has required Admelog correction 0/4/0/0 in addition to Lantus 10 units HS and Admelog 0/0/2 AC  - Will continue Lantus 10 units HS, hold further pre-meal, and continue with moderate intensity insulin correction scale to titrate therapy to glycemic POCT target 100-180  - Patient is not a candidate for Tradjenta in-patient use due to A1C > 7.5%  - Plan for discharge: Patient to be discharged with basal insulin, Lantus Solostar 10 units HS, and outpatient endocrinology with Dr. Conway follow up to evaluate possibility of T1DM per MD given age of diagnosis  - [facility] to [facility] for 3/6, will provide patient with Lantus and supplies x 28 days in order to allow for discharge today  - Reviewed insulin administration with patient and mother at bedside: Patient was able to successfully complete teach back  - RN to review BGM technique with patient prior to discharge

## 2024-03-05 NOTE — DIETITIAN INITIAL EVALUATION ADULT - ENERGY INTAKE
The patient is a 38y moderately obese Female with significant PMH of Hyperthyroidism poorly controlled with methimazole (diagnosed in 11/2023), PCOD ( Polycystic ovarial disease), GERD, Fibromyalgia and others presented in ED with c/o vague abdominal pain, non-bloody vomiting and nausea.  Patient says that she intermittent nausea and vomiting for more than a year, and she takes Zofran and protonix as needed.  But for last 2-3 days, she has more nausea and vomiting as well as abdominal pain.  Her abdominal pain is more marked in epigastric area, and current pain level is 5/10 in intensity, without radiation and no aggravating or alleviating factors. She also feels hot and warm all the time.  She says that she has been drinking a lot of water, and she still feels dehydrated. She denies palpitation or diarrhea.  Her appetite is not good for last 2-3 days due to pain and nausea. She says that she was diagnosed with PCOD as teenaged, and takes OCP intermittently.     Admit dx:  abdominal pain  EMR wt is 98 kg  217#  Pt reports UBW is 200#  Unable to get bedscale wt  NFPE reveals muscle wasting, fat wasting   PO intake estimated < 75% ENN > one month  Pt with new dx of T2DM  HgbA1c  13.4  Pt on POCT, pt within target levels  A target glucose range of 140-180 mg/dL is recommended for most critically ill and non-critically ill patients.  Suggest glucerna TID  Pt reports that due to a stomach condition, she has been nauseated for the past year.  She feels better at , she is receiving Zofran  She eats once a day, says she'd rather not eat than eat more  Discussed with pt the importance of more consistent PO intake, especially since she is on insulin  and will likely continue to be on insulin after discharge  Discussed physical activity with pt, however she has problems moving most parts of her body.  Chair yoga, chair exercises via Youtube recommended.  Pt reports she has an appointment with Endocrinologist, pt would benefit from extensive and supportive counseling. Suggest pt inquire about CGM.  Pt given in depth explanations and suggestions for diabetes.  Recommendations to follow in Plan/Intervention      Fair (50-75%)

## 2024-03-05 NOTE — DISCHARGE NOTE PROVIDER - CARE PROVIDERS DIRECT ADDRESSES
[email or web address]

## 2024-03-05 NOTE — DIETITIAN INITIAL EVALUATION ADULT - OTHER INFO
The patient is a 38y moderately obese Female with significant PMH of Hyperthyroidism poorly controlled with methimazole (diagnosed in 11/2023), PCOD ( Polycystic ovarial disease), GERD, Fibromyalgia and others presented in ED with c/o vague abdominal pain, non-bloody vomiting and nausea.  Patient says that she intermittent nausea and vomiting for more than a year, and she takes Zofran and protonix as needed.  But for last 2-3 days, she has more nausea and vomiting as well as abdominal pain.  Her abdominal pain is more marked in epigastric area, and current pain level is 5/10 in intensity, without radiation and no aggravating or alleviating factors. She also feels hot and warm all the time.  She says that she has been drinking a lot of water, and she still feels dehydrated. She denies palpitation or diarrhea.  Her appetite is not good for last 2-3 days due to pain and nausea. She says that she was diagnosed with PCOD as teenaged, and takes OCP intermittently.     Admit dx:  abdominal pain  EMR wt is 98 kg  217#  Pt reports UBW is 200#  Unable to get bedscale wt  NFPE reveals muscle wasting, fat wasting   PO intake estimated < 75% ENN > one month  Pt with new dx of T2DM  HgbA1c  13.4  Pt on POCT, pt within target levels  A target glucose range of 140-180 mg/dL is recommended for most critically ill and non-critically ill patients.  Suggest glucerna TID  Pt reports that due to a stomach condition, she has been nauseated for the past year.  She feels better at , she is receiving Zofran  She eats once a day, says she'd rather not eat than eat more  Discussed with pt the importance of more consistent PO intake, especially since she is on insulin  and will likely continue to be on insulin after discharge  Discussed physical activity with pt, however she has problems moving most parts of her body.  Chair yoga, chair exercises via Youtube recommended.  Pt reports she has an appointment with Endocrinologist, pt would benefit from extensive and supportive counseling. Suggest pt inquire about CGM.  Pt given in depth explanations and suggestions for diabetes.  Recommendations to follow in Plan/Intervention

## 2024-03-05 NOTE — DIETITIAN INITIAL EVALUATION ADULT - PERTINENT MEDS FT
MEDICATIONS  (STANDING):  ciprofloxacin   IVPB 400 milliGRAM(s) IV Intermittent every 12 hours  dextrose 5%. 1000 milliLiter(s) (100 mL/Hr) IV Continuous <Continuous>  dextrose 5%. 1000 milliLiter(s) (50 mL/Hr) IV Continuous <Continuous>  dextrose 50% Injectable 25 Gram(s) IV Push once  dextrose 50% Injectable 25 Gram(s) IV Push once  dextrose 50% Injectable 12.5 Gram(s) IV Push once  enoxaparin Injectable 40 milliGRAM(s) SubCutaneous every 24 hours  glucagon  Injectable 1 milliGRAM(s) IntraMuscular once  insulin glargine Injectable (LANTUS) 10 Unit(s) SubCutaneous at bedtime  insulin lispro (ADMELOG) corrective regimen sliding scale   SubCutaneous at bedtime  insulin lispro (ADMELOG) corrective regimen sliding scale   SubCutaneous three times a day before meals  insulin lispro Injectable (ADMELOG) 2 Unit(s) SubCutaneous three times a day before meals  methimazole 2.5 milliGRAM(s) Oral <User Schedule>  metroNIDAZOLE  IVPB      metroNIDAZOLE  IVPB 500 milliGRAM(s) IV Intermittent every 8 hours  pantoprazole    Tablet 40 milliGRAM(s) Oral before breakfast    MEDICATIONS  (PRN):  acetaminophen     Tablet .. 650 milliGRAM(s) Oral every 6 hours PRN Temp greater or equal to 38C (100.4F), Mild Pain (1 - 3)  aluminum hydroxide/magnesium hydroxide/simethicone Suspension 30 milliLiter(s) Oral every 4 hours PRN Dyspepsia  dextrose Oral Gel 15 Gram(s) Oral once PRN Blood Glucose LESS THAN 70 milliGRAM(s)/deciliter  HYDROmorphone   Tablet 2 milliGRAM(s) Oral four times a day PRN Severe Pain (7 - 10)  melatonin 3 milliGRAM(s) Oral at bedtime PRN Insomnia  metoclopramide Injectable 10 milliGRAM(s) IV Push every 8 hours PRN N+V  ondansetron Injectable 4 milliGRAM(s) IV Push every 6 hours PRN Nausea and/or Vomiting

## 2024-03-05 NOTE — DIETITIAN INITIAL EVALUATION ADULT - HEIGHT FOR BMI (INCHES)
Call placed to patient regarding questions about scan. Patient informed about tumor board recommendations per Dr. Brower's message:     Please call patient on my behalf to let her know that her case was presented at our thoracic tumor board her surgeon Dr. Medley was present recommendation to proceed with adjuvant therapy and will follow-up area of increased uptake on the scan which we feel is likely due to recent surgery.  Please be sure she is set for her chemotherapy this week tomorrow?      Patient verbalized all understanding of recommendations, and will proceed with chemotherapy scheduled 5/12/23.   
4

## 2024-03-05 NOTE — DIETITIAN INITIAL EVALUATION ADULT - NSICDXPASTMEDICALHX_GEN_ALL_CORE_FT
PAST MEDICAL HISTORY:  Asthma     Fibromyalgia     GERD (gastroesophageal reflux disease)     Moderate obesity     Polycystic ovary disease     Primary hyperthyroidism

## 2024-03-05 NOTE — DISCHARGE NOTE PROVIDER - NSDCMRMEDTOKEN_GEN_ALL_CORE_FT
Elvira Mcgee Pen 100 units/mL subcutaneous solution: 10 unit(s) subcutaneous once a day (at bedtime) unit(s) subcutaneous once a day  methIMAzole 5 mg oral tablet: 0.5 tab(s) orally once a day  ondansetron 4 mg oral tablet: 1 tab(s) orally 3 times a day as needed for  nausea  pantoprazole 40 mg oral delayed release tablet: 1 tab(s) orally once a day   alcohol swabs: Apply topically to affected area 4 times a day  Carafate 1 g/10 mL oral suspension: 10 milliliter(s) orally 4 times a day before meals and at bedtime  ciprofloxacin 500 mg oral tablet: 1 tab(s) orally 2 times a day  glucometer (per patient&#x27;s insurance): Test blood sugars four times a day. Dispense #1 glucometer.  lancets: 1 application subcutaneously 4 times a day  Lantus Solostar Pen 100 units/mL subcutaneous solution: 10 unit(s) subcutaneous once a day (at bedtime) unit(s) subcutaneous once a day  methIMAzole 5 mg oral tablet: 0.5 tab(s) orally once a day  metroNIDAZOLE 500 mg oral tablet: 1 tab(s) orally 3 times a day  ondansetron 4 mg oral tablet: 1 tab(s) orally 3 times a day as needed for  nausea  pantoprazole 40 mg oral delayed release tablet: 1 tab(s) orally once a day  test strips (per patient&#x27;s insurance): 1 application subcutaneously 4 times a day. ** Compatible with patient&#x27;s glucometer **

## 2024-03-05 NOTE — DIETITIAN INITIAL EVALUATION ADULT - ORAL INTAKE PTA/DIET HISTORY
Pt lives with mother, who shops and cooks for her.  Pt uses walker, has difficulty preparing her own foods.  Pt reports that she has been nauseated every day for past year. She usually eats one meal a day, and says she's very picky. She doesn't eat most vegetables,  red meat, seafood, tofu, eggs. She doesn't eat much bread.  Her appetite has further decreased in past 2-3 days.  PO intake estimated < 75% ENN > one month.

## 2024-03-05 NOTE — DISCHARGE NOTE PROVIDER - HOSPITAL COURSE
38y moderately obese Female with significant PMH of Hyperthyroidism poorly controlled with methimazole (diagnosed in 11/2023), PCOD ( Polycystic ovarial disease), GERD, Fibromyalgia and others presented in ED with c/o vague abdominal pain, non-bloody vomiting and nausea.  Patient says that she intermittent nausea and vomiting for more than a year, and she takes Zofran and protonix as needed.  But for last 2-3 days, she has more nausea and vomiting as well as abdominal pain.  Her abdominal pain is more marked in epigastric area, and current pain level is 5/10 in intensity, without radiation and no aggravating or alleviating factors. She also feels hot and warm all the time.  She says that she has been drinking a lot of water, and she still feels dehydrated. She denies palpitation or diarrhea.  Her appetite is not good for last 2-3 days due to pain and nausea. She says that she was diagnosed with PCOD as teenaged, and takes OCP intermittently. She was diagnosed hyperthyroidism in last November and she follows with her Endocrinologist Dr Mills with [facility]. She says that she was initially started on Methimazole 10 mg two times daily, and then it was discontinued for a month as her thyroid was ok.  Then, she had f/u with her endocrinologist about 7-10 days ago, and was started on Methimazole 2.5 mg po daily 5 days ago.  She had not taken her Methimazole yesterday (03/01/2024) due to abd pain and nausea.   Denies smoking, alcohol or substance abuse.  She takes Methimazole, Protonix and Zofran at home.      #Acute hyperglycemia with elevated acetone, new onset DM  #Starvation ketosis  #Intravascular volume depletion  -Not in DKA. No AG  -ISS  -LION, pre-meal   -will need diabetic education  -A1c level: 13  -endo follow up as o/p with Dr mills has appt 3/15/24    # Uncontrolled hyperthyroidism, not in thyroid storm.  -TSH low at <0.01 and FT4 elevated at 2.39. FT3 pending.  No prior level for comparison.  -Will increase her Methimazole to 5 mg po twice daily.  -Repeat TFTs in 4-6 weeks, and adjust the dose of Methimazole.  -Unfortunately, we do not have inpatient endocrinology service here in .  -F/u as outpatient with her endocrinology upon discharge.    # Epigastric abdominal pain likely due to combination of enteritis, possible PUD, and adnexal cysts.  -Protonix 40 mg IV daily.  -cipro/flagyl  -increase diet as tolerated  -Morphine prn.  -OB/Gyn consult note reviewed and appreciated. NTD, findings are c/w PCOS      # DVT prophylaxis: Lovenox sq daily.

## 2024-03-05 NOTE — DISCHARGE NOTE PROVIDER - ATTENDING DISCHARGE PHYSICAL EXAMINATION:
Patient seen today, c/o epigastric pain, able to tolerate orally somewhat, gave carafate and pt pain improved a little, able to tolerate glucerna and had meal.     Vital Signs Last 24 Hrs  T(C): 36.8 (05 Mar 2024 07:11), Max: 36.9 (04 Mar 2024 16:15)  T(F): 98.2 (05 Mar 2024 07:11), Max: 98.4 (04 Mar 2024 16:15)  HR: 75 (05 Mar 2024 07:11) (75 - 91)  BP: 121/73 (05 Mar 2024 07:11) (121/73 - 148/95)  RR: 16 (05 Mar 2024 07:11) (16 - 18)  SpO2: 99% (05 Mar 2024 07:11) (99% - 100%)    Parameters below as of 05 Mar 2024 07:11  Patient On (Oxygen Delivery Method): room air    PHYSICAL EXAM:  GENERAL: NAD, lying in bed comfortably  CHEST/LUNG: Clear to auscultation bilaterally; No rales, rhonchi, wheezing. Unlabored respirations  HEART: Regular rate and rhythm; No murmurs  ABDOMEN: Bowel sounds present; Soft, Nontender, Nondistended.   EXTREMITIES:  2+ Peripheral Pulses, brisk capillary refill. No clubbing, cyanosis, or edema  NERVOUS SYSTEM:  Alert & Oriented X3, speech clear. No deficits   MSK: FROM all 4 extremities, full and equal strength

## 2024-03-05 NOTE — DISCHARGE NOTE PROVIDER - CARE PROVIDER_API CALL
AIDAN HENSON  Phone: [phone]  Fax: [phone])-  Follow Up Time:     Augie Sepulveda  [facility]  [address] [place], [place] [id number]  Phone: [phone]  Fax: [phone]  Follow Up Time: 2 weeks

## 2024-03-05 NOTE — DIETITIAN INITIAL EVALUATION ADULT - ETIOLOGY
r/t inability to consume sufficient energy/protein 2/2 abdominal issues of unknown etiology, hx of fibromyalgia, stomach issues r/t inability to consume sufficient energy/protein 2/2 possible enteritis,  hx of fibromyalgia, stomach issues, possible gastroparesis

## 2024-03-07 LAB
CULTURE RESULTS: SIGNIFICANT CHANGE UP
CULTURE RESULTS: SIGNIFICANT CHANGE UP
SPECIMEN SOURCE: SIGNIFICANT CHANGE UP
SPECIMEN SOURCE: SIGNIFICANT CHANGE UP

## 2024-03-11 DIAGNOSIS — E88.89 OTHER SPECIFIED METABOLIC DISORDERS: ICD-10-CM

## 2024-03-11 DIAGNOSIS — Z71.3 DIETARY COUNSELING AND SURVEILLANCE: ICD-10-CM

## 2024-03-11 DIAGNOSIS — E11.10 TYPE 2 DIABETES MELLITUS WITH KETOACIDOSIS WITHOUT COMA: ICD-10-CM

## 2024-03-11 DIAGNOSIS — E87.20 ACIDOSIS, UNSPECIFIED: ICD-10-CM

## 2024-03-11 DIAGNOSIS — E28.2 POLYCYSTIC OVARIAN SYNDROME: ICD-10-CM

## 2024-03-11 DIAGNOSIS — E86.0 DEHYDRATION: ICD-10-CM

## 2024-03-11 DIAGNOSIS — Z91.013 ALLERGY TO SEAFOOD: ICD-10-CM

## 2024-03-11 DIAGNOSIS — M79.7 FIBROMYALGIA: ICD-10-CM

## 2024-03-11 DIAGNOSIS — E21.0 PRIMARY HYPERPARATHYROIDISM: ICD-10-CM

## 2024-03-11 DIAGNOSIS — J45.909 UNSPECIFIED ASTHMA, UNCOMPLICATED: ICD-10-CM

## 2024-03-11 DIAGNOSIS — E03.9 HYPOTHYROIDISM, UNSPECIFIED: ICD-10-CM

## 2024-03-11 DIAGNOSIS — K27.9 PEPTIC ULCER, SITE UNSPECIFIED, UNSPECIFIED AS ACUTE OR CHRONIC, WITHOUT HEMORRHAGE OR PERFORATION: ICD-10-CM

## 2024-03-11 DIAGNOSIS — E11.65 TYPE 2 DIABETES MELLITUS WITH HYPERGLYCEMIA: ICD-10-CM

## 2024-03-11 DIAGNOSIS — K52.9 NONINFECTIVE GASTROENTERITIS AND COLITIS, UNSPECIFIED: ICD-10-CM

## 2024-03-11 DIAGNOSIS — E66.9 OBESITY, UNSPECIFIED: ICD-10-CM

## 2024-03-11 DIAGNOSIS — Z91.018 ALLERGY TO OTHER FOODS: ICD-10-CM

## 2024-03-11 DIAGNOSIS — N70.11 CHRONIC SALPINGITIS: ICD-10-CM

## 2024-05-10 ENCOUNTER — RX RENEWAL (OUTPATIENT)
Age: 39
End: 2024-05-10

## 2024-05-13 ENCOUNTER — RX CHANGE (OUTPATIENT)
Age: 39
End: 2024-05-13

## 2024-05-13 RX ORDER — PANTOPRAZOLE 40 MG/1
40 TABLET, DELAYED RELEASE ORAL
Qty: 90 | Refills: 3 | Status: ACTIVE | COMMUNITY
Start: 2023-07-14 | End: 1900-01-01

## 2024-07-13 ENCOUNTER — EMERGENCY (EMERGENCY)
Facility: HOSPITAL | Age: 39
LOS: 0 days | Discharge: HOME | End: 2024-07-13
Attending: EMERGENCY MEDICINE
Payer: MEDICAID

## 2024-07-13 VITALS
HEIGHT: 64 IN | WEIGHT: 199.96 LBS | SYSTOLIC BLOOD PRESSURE: 118 MMHG | HEART RATE: 101 BPM | TEMPERATURE: 98 F | DIASTOLIC BLOOD PRESSURE: 97 MMHG | RESPIRATION RATE: 19 BRPM | OXYGEN SATURATION: 100 %

## 2024-07-13 VITALS
SYSTOLIC BLOOD PRESSURE: 134 MMHG | OXYGEN SATURATION: 99 % | TEMPERATURE: 98 F | HEART RATE: 113 BPM | DIASTOLIC BLOOD PRESSURE: 62 MMHG | RESPIRATION RATE: 20 BRPM

## 2024-07-13 DIAGNOSIS — E10.9 TYPE 1 DIABETES MELLITUS WITHOUT COMPLICATIONS: ICD-10-CM

## 2024-07-13 DIAGNOSIS — J45.909 UNSPECIFIED ASTHMA, UNCOMPLICATED: ICD-10-CM

## 2024-07-13 DIAGNOSIS — E66.9 OBESITY, UNSPECIFIED: ICD-10-CM

## 2024-07-13 DIAGNOSIS — S32.019A UNSPECIFIED FRACTURE OF FIRST LUMBAR VERTEBRA, INITIAL ENCOUNTER FOR CLOSED FRACTURE: ICD-10-CM

## 2024-07-13 DIAGNOSIS — Z90.49 ACQUIRED ABSENCE OF OTHER SPECIFIED PARTS OF DIGESTIVE TRACT: Chronic | ICD-10-CM

## 2024-07-13 DIAGNOSIS — M25.532 PAIN IN LEFT WRIST: ICD-10-CM

## 2024-07-13 DIAGNOSIS — Z91.041 RADIOGRAPHIC DYE ALLERGY STATUS: ICD-10-CM

## 2024-07-13 DIAGNOSIS — M79.7 FIBROMYALGIA: ICD-10-CM

## 2024-07-13 DIAGNOSIS — Z91.013 ALLERGY TO SEAFOOD: ICD-10-CM
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
## 2024-07-13 DIAGNOSIS — Z88.1 ALLERGY STATUS TO OTHER ANTIBIOTIC AGENTS: ICD-10-CM

## 2024-07-13 DIAGNOSIS — M54.50 LOW BACK PAIN, UNSPECIFIED: ICD-10-CM
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
## 2024-07-13 DIAGNOSIS — E03.9 HYPOTHYROIDISM, UNSPECIFIED: ICD-10-CM

## 2024-07-13 DIAGNOSIS — S63.502A UNSPECIFIED SPRAIN OF LEFT WRIST, INITIAL ENCOUNTER: ICD-10-CM

## 2024-07-13 PROBLEM — E66.8 OTHER OBESITY: Chronic | Status: ACTIVE | Noted: 2024-03-02

## 2024-07-13 PROBLEM — E28.2 POLYCYSTIC OVARIAN SYNDROME: Chronic | Status: ACTIVE | Noted: 2024-03-02

## 2024-07-13 PROBLEM — E05.90 THYROTOXICOSIS, UNSPECIFIED WITHOUT THYROTOXIC CRISIS OR STORM: Chronic | Status: ACTIVE | Noted: 2024-03-02

## 2024-07-13 PROBLEM — K21.9 GASTRO-ESOPHAGEAL REFLUX DISEASE WITHOUT ESOPHAGITIS: Chronic | Status: ACTIVE | Noted: 2024-03-02

## 2024-07-13 LAB
ACETONE SERPL-MCNC: NEGATIVE — SIGNIFICANT CHANGE UP
ADD ON TEST-SPECIMEN IN LAB: SIGNIFICANT CHANGE UP
ALBUMIN SERPL ELPH-MCNC: 3.5 G/DL — SIGNIFICANT CHANGE UP (ref 3.3–5)
ALP SERPL-CCNC: 111 U/L — SIGNIFICANT CHANGE UP (ref 40–120)
ALT FLD-CCNC: 32 U/L — SIGNIFICANT CHANGE UP (ref 12–78)
ANION GAP SERPL CALC-SCNC: 9 MMOL/L — SIGNIFICANT CHANGE UP (ref 5–17)
APPEARANCE UR: CLEAR — SIGNIFICANT CHANGE UP
AST SERPL-CCNC: 34 U/L — SIGNIFICANT CHANGE UP (ref 15–37)
BASE EXCESS BLDV CALC-SCNC: -2.2 MMOL/L — LOW (ref -2–3)
BASOPHILS # BLD AUTO: 0.03 K/UL — SIGNIFICANT CHANGE UP (ref 0–0.2)
BASOPHILS NFR BLD AUTO: 0.5 % — SIGNIFICANT CHANGE UP (ref 0–2)
BILIRUB SERPL-MCNC: 0.4 MG/DL — SIGNIFICANT CHANGE UP (ref 0.2–1.2)
BILIRUB UR-MCNC: NEGATIVE — SIGNIFICANT CHANGE UP
BUN SERPL-MCNC: 11 MG/DL — SIGNIFICANT CHANGE UP (ref 7–23)
CALCIUM SERPL-MCNC: 9.1 MG/DL — SIGNIFICANT CHANGE UP (ref 8.5–10.1)
CHLORIDE SERPL-SCNC: 99 MMOL/L — SIGNIFICANT CHANGE UP (ref 96–108)
CO2 SERPL-SCNC: 23 MMOL/L — SIGNIFICANT CHANGE UP (ref 22–31)
COLOR SPEC: YELLOW — SIGNIFICANT CHANGE UP
CREAT SERPL-MCNC: 0.97 MG/DL — SIGNIFICANT CHANGE UP (ref 0.5–1.3)
DIFF PNL FLD: NEGATIVE — SIGNIFICANT CHANGE UP
EGFR: 77 ML/MIN/1.73M2 — SIGNIFICANT CHANGE UP
EGFR: 77 ML/MIN/1.73M2 — SIGNIFICANT CHANGE UP
EOSINOPHIL # BLD AUTO: 0.11 K/UL — SIGNIFICANT CHANGE UP (ref 0–0.5)
EOSINOPHIL NFR BLD AUTO: 1.9 % — SIGNIFICANT CHANGE UP (ref 0–6)
GLUCOSE BLDC GLUCOMTR-MCNC: 379 MG/DL — HIGH (ref 70–99)
GLUCOSE SERPL-MCNC: 733 MG/DL — CRITICAL HIGH (ref 70–99)
GLUCOSE UR QL: >=1000 MG/DL
HCG SERPL-ACNC: 1 MIU/ML — SIGNIFICANT CHANGE UP
HCO3 BLDV-SCNC: 23 MMOL/L — SIGNIFICANT CHANGE UP (ref 22–29)
HCT VFR BLD CALC: 38 % — SIGNIFICANT CHANGE UP (ref 34.5–45)
HGB BLD-MCNC: 12.8 G/DL — SIGNIFICANT CHANGE UP (ref 11.5–15.5)
IMM GRANULOCYTES NFR BLD AUTO: 0.5 % — SIGNIFICANT CHANGE UP (ref 0–0.9)
KETONES UR-MCNC: NEGATIVE MG/DL — SIGNIFICANT CHANGE UP
LEUKOCYTE ESTERASE UR-ACNC: NEGATIVE — SIGNIFICANT CHANGE UP
LYMPHOCYTES # BLD AUTO: 1.65 K/UL — SIGNIFICANT CHANGE UP (ref 1–3.3)
LYMPHOCYTES # BLD AUTO: 28.1 % — SIGNIFICANT CHANGE UP (ref 13–44)
MCHC RBC-ENTMCNC: 26 PG — LOW (ref 27–34)
MCHC RBC-ENTMCNC: 33.7 GM/DL — SIGNIFICANT CHANGE UP (ref 32–36)
MCV RBC AUTO: 77.1 FL — LOW (ref 80–100)
MONOCYTES # BLD AUTO: 0.48 K/UL — SIGNIFICANT CHANGE UP (ref 0–0.9)
MONOCYTES NFR BLD AUTO: 8.2 % — SIGNIFICANT CHANGE UP (ref 2–14)
NEUTROPHILS # BLD AUTO: 3.57 K/UL — SIGNIFICANT CHANGE UP (ref 1.8–7.4)
NEUTROPHILS NFR BLD AUTO: 60.8 % — SIGNIFICANT CHANGE UP (ref 43–77)
NITRITE UR-MCNC: NEGATIVE — SIGNIFICANT CHANGE UP
PCO2 BLDV: 41 MMHG — SIGNIFICANT CHANGE UP (ref 39–42)
PH BLDV: 7.36 — SIGNIFICANT CHANGE UP (ref 7.32–7.43)
PH UR: 5.5 — SIGNIFICANT CHANGE UP (ref 5–8)
PLATELET # BLD AUTO: 263 K/UL — SIGNIFICANT CHANGE UP (ref 150–400)
PO2 BLDV: 98 MMHG — HIGH (ref 25–45)
POTASSIUM SERPL-MCNC: 4.8 MMOL/L — SIGNIFICANT CHANGE UP (ref 3.5–5.3)
POTASSIUM SERPL-SCNC: 4.8 MMOL/L — SIGNIFICANT CHANGE UP (ref 3.5–5.3)
PROT SERPL-MCNC: 7.1 GM/DL — SIGNIFICANT CHANGE UP (ref 6–8.3)
PROT UR-MCNC: NEGATIVE MG/DL — SIGNIFICANT CHANGE UP
RBC # BLD: 4.93 M/UL — SIGNIFICANT CHANGE UP (ref 3.8–5.2)
RBC # FLD: 13.3 % — SIGNIFICANT CHANGE UP (ref 10.3–14.5)
SAO2 % BLDV: 98 % — HIGH (ref 67–88)
SODIUM SERPL-SCNC: 131 MMOL/L — LOW (ref 135–145)
SP GR SPEC: >1.03 — HIGH (ref 1–1.03)
UROBILINOGEN FLD QL: 0.2 MG/DL — SIGNIFICANT CHANGE UP (ref 0.2–1)
WBC # BLD: 5.87 K/UL — SIGNIFICANT CHANGE UP (ref 3.8–10.5)
WBC # FLD AUTO: 5.87 K/UL — SIGNIFICANT CHANGE UP (ref 3.8–10.5)

## 2024-07-13 PROCEDURE — 82803 BLOOD GASES ANY COMBINATION: CPT

## 2024-07-13 PROCEDURE — 72131 CT LUMBAR SPINE W/O DYE: CPT | Mod: MC

## 2024-07-13 PROCEDURE — 73110 X-RAY EXAM OF WRIST: CPT | Mod: 26,LT

## 2024-07-13 PROCEDURE — 84702 CHORIONIC GONADOTROPIN TEST: CPT

## 2024-07-13 PROCEDURE — 72131 CT LUMBAR SPINE W/O DYE: CPT | Mod: 26,MC

## 2024-07-13 PROCEDURE — 85025 COMPLETE CBC W/AUTO DIFF WBC: CPT

## 2024-07-13 PROCEDURE — 73130 X-RAY EXAM OF HAND: CPT | Mod: 26,LT

## 2024-07-13 PROCEDURE — 71045 X-RAY EXAM CHEST 1 VIEW: CPT | Mod: 26

## 2024-07-13 PROCEDURE — 82962 GLUCOSE BLOOD TEST: CPT

## 2024-07-13 PROCEDURE — 71045 X-RAY EXAM CHEST 1 VIEW: CPT

## 2024-07-13 PROCEDURE — 82009 KETONE BODYS QUAL: CPT

## 2024-07-13 PROCEDURE — 81003 URINALYSIS AUTO W/O SCOPE: CPT

## 2024-07-13 PROCEDURE — 96374 THER/PROPH/DIAG INJ IV PUSH: CPT

## 2024-07-13 PROCEDURE — 80053 COMPREHEN METABOLIC PANEL: CPT

## 2024-07-13 PROCEDURE — 73110 X-RAY EXAM OF WRIST: CPT | Mod: LT

## 2024-07-13 PROCEDURE — 99285 EMERGENCY DEPT VISIT HI MDM: CPT | Mod: 25

## 2024-07-13 PROCEDURE — 36415 COLL VENOUS BLD VENIPUNCTURE: CPT

## 2024-07-13 PROCEDURE — 73130 X-RAY EXAM OF HAND: CPT | Mod: LT

## 2024-07-13 PROCEDURE — 99284 EMERGENCY DEPT VISIT MOD MDM: CPT | Mod: 25

## 2024-07-13 PROCEDURE — 96375 TX/PRO/DX INJ NEW DRUG ADDON: CPT

## 2024-07-13 RX ORDER — OXYCODONE HYDROCHLORIDE AND ACETAMINOPHEN 10; 325 MG/1; MG/1
1 TABLET ORAL
Qty: 12 | Refills: 0
Start: 2024-07-13

## 2024-07-13 RX ORDER — KETOROLAC TROMETHAMINE 30 MG/ML
30 INJECTION, SOLUTION INTRAMUSCULAR; INTRAVENOUS ONCE
Refills: 0 | Status: DISCONTINUED | OUTPATIENT
Start: 2024-07-13 | End: 2024-07-13

## 2024-07-13 RX ORDER — METHOCARBAMOL 500 MG/1
750 TABLET, FILM COATED ORAL ONCE
Refills: 0 | Status: COMPLETED | OUTPATIENT
Start: 2024-07-13 | End: 2024-07-13

## 2024-07-13 RX ORDER — CYCLOBENZAPRINE HYDROCHLORIDE 15 MG/1
1 CAPSULE, EXTENDED RELEASE ORAL
Qty: 10 | Refills: 0
Start: 2024-07-13 | End: 2024-07-17

## 2024-07-13 RX ORDER — LIDOCAINE HYDROCHLORIDE 20 MG/ML
1 JELLY TOPICAL ONCE
Refills: 0 | Status: COMPLETED | OUTPATIENT
Start: 2024-07-13 | End: 2024-07-13

## 2024-07-13 RX ORDER — HYDROMORPHONE/SOD CHLOR,ISO/PF 2 MG/10 ML
1 SYRINGE (ML) INJECTION ONCE
Refills: 0 | Status: DISCONTINUED | OUTPATIENT
Start: 2024-07-13 | End: 2024-07-13

## 2024-07-13 RX ADMIN — Medication 1 MILLIGRAM(S): at 07:51

## 2024-07-13 RX ADMIN — Medication 8 UNIT(S): at 06:54

## 2024-07-13 RX ADMIN — Medication 2000 MILLILITER(S): at 06:54

## 2024-07-13 RX ADMIN — Medication 4 MILLIGRAM(S): at 06:09

## 2024-07-13 RX ADMIN — LIDOCAINE HYDROCHLORIDE 1 PATCH: 20 JELLY TOPICAL at 07:51

## 2024-10-21 ENCOUNTER — RX RENEWAL (OUTPATIENT)
Age: 39
End: 2024-10-21

## 2025-03-04 ENCOUNTER — EMERGENCY (EMERGENCY)
Facility: HOSPITAL | Age: 40
LOS: 0 days | Discharge: HOME | End: 2025-03-04
Attending: STUDENT IN AN ORGANIZED HEALTH CARE EDUCATION/TRAINING PROGRAM
Payer: MEDICAID

## 2025-03-04 VITALS
TEMPERATURE: 98 F | SYSTOLIC BLOOD PRESSURE: 148 MMHG | OXYGEN SATURATION: 100 % | DIASTOLIC BLOOD PRESSURE: 96 MMHG | HEART RATE: 123 BPM | RESPIRATION RATE: 20 BRPM

## 2025-03-04 VITALS
TEMPERATURE: 98 F | DIASTOLIC BLOOD PRESSURE: 73 MMHG | OXYGEN SATURATION: 100 % | RESPIRATION RATE: 18 BRPM | HEART RATE: 120 BPM | SYSTOLIC BLOOD PRESSURE: 130 MMHG

## 2025-03-04 DIAGNOSIS — E05.90 THYROTOXICOSIS, UNSPECIFIED WITHOUT THYROTOXIC CRISIS OR STORM: ICD-10-CM

## 2025-03-04 DIAGNOSIS — Z91.013 ALLERGY TO SEAFOOD: ICD-10-CM

## 2025-03-04 DIAGNOSIS — J45.909 UNSPECIFIED ASTHMA, UNCOMPLICATED: ICD-10-CM

## 2025-03-04 DIAGNOSIS — Z91.018 ALLERGY TO OTHER FOODS: ICD-10-CM

## 2025-03-04 DIAGNOSIS — M54.6 PAIN IN THORACIC SPINE: ICD-10-CM

## 2025-03-04 DIAGNOSIS — R06.02 SHORTNESS OF BREATH: ICD-10-CM

## 2025-03-04 DIAGNOSIS — E10.9 TYPE 1 DIABETES MELLITUS WITHOUT COMPLICATIONS: ICD-10-CM

## 2025-03-04 DIAGNOSIS — K21.9 GASTRO-ESOPHAGEAL REFLUX DISEASE WITHOUT ESOPHAGITIS: ICD-10-CM

## 2025-03-04 DIAGNOSIS — Z90.49 ACQUIRED ABSENCE OF OTHER SPECIFIED PARTS OF DIGESTIVE TRACT: Chronic | ICD-10-CM

## 2025-03-04 DIAGNOSIS — M79.7 FIBROMYALGIA: ICD-10-CM

## 2025-03-04 DIAGNOSIS — Z88.0 ALLERGY STATUS TO PENICILLIN: ICD-10-CM

## 2025-03-04 DIAGNOSIS — R00.0 TACHYCARDIA, UNSPECIFIED: ICD-10-CM

## 2025-03-04 LAB
ADD ON TEST-SPECIMEN IN LAB: SIGNIFICANT CHANGE UP
ALBUMIN SERPL ELPH-MCNC: 3.6 G/DL — SIGNIFICANT CHANGE UP (ref 3.3–5)
ALP SERPL-CCNC: 104 U/L — SIGNIFICANT CHANGE UP (ref 40–120)
ALT FLD-CCNC: 17 U/L — SIGNIFICANT CHANGE UP (ref 12–78)
ANION GAP SERPL CALC-SCNC: 5 MMOL/L — SIGNIFICANT CHANGE UP (ref 5–17)
AST SERPL-CCNC: 15 U/L — SIGNIFICANT CHANGE UP (ref 15–37)
BASOPHILS # BLD AUTO: 0.04 K/UL — SIGNIFICANT CHANGE UP (ref 0–0.2)
BASOPHILS NFR BLD AUTO: 0.5 % — SIGNIFICANT CHANGE UP (ref 0–2)
BILIRUB SERPL-MCNC: 0.4 MG/DL — SIGNIFICANT CHANGE UP (ref 0.2–1.2)
BUN SERPL-MCNC: 11 MG/DL — SIGNIFICANT CHANGE UP (ref 7–23)
CALCIUM SERPL-MCNC: 9.2 MG/DL — SIGNIFICANT CHANGE UP (ref 8.5–10.1)
CHLORIDE SERPL-SCNC: 109 MMOL/L — HIGH (ref 96–108)
CO2 SERPL-SCNC: 24 MMOL/L — SIGNIFICANT CHANGE UP (ref 22–31)
CREAT SERPL-MCNC: 0.65 MG/DL — SIGNIFICANT CHANGE UP (ref 0.5–1.3)
EGFR: 116 ML/MIN/1.73M2 — SIGNIFICANT CHANGE UP
EOSINOPHIL # BLD AUTO: 0.35 K/UL — SIGNIFICANT CHANGE UP (ref 0–0.5)
EOSINOPHIL NFR BLD AUTO: 4.4 % — SIGNIFICANT CHANGE UP (ref 0–6)
FLUAV AG NPH QL: SIGNIFICANT CHANGE UP
FLUBV AG NPH QL: SIGNIFICANT CHANGE UP
GLUCOSE SERPL-MCNC: 88 MG/DL — SIGNIFICANT CHANGE UP (ref 70–99)
HCG SERPL-ACNC: 1 MIU/ML — SIGNIFICANT CHANGE UP
HCT VFR BLD CALC: 39.2 % — SIGNIFICANT CHANGE UP (ref 34.5–45)
HGB BLD-MCNC: 12.8 G/DL — SIGNIFICANT CHANGE UP (ref 11.5–15.5)
IMM GRANULOCYTES # BLD AUTO: 0.01 K/UL — SIGNIFICANT CHANGE UP (ref 0–0.07)
IMM GRANULOCYTES NFR BLD AUTO: 0.1 % — SIGNIFICANT CHANGE UP (ref 0–0.9)
LIDOCAIN IGE QN: 16 U/L — SIGNIFICANT CHANGE UP (ref 13–75)
LYMPHOCYTES # BLD AUTO: 2.59 K/UL — SIGNIFICANT CHANGE UP (ref 1–3.3)
LYMPHOCYTES NFR BLD AUTO: 32.5 % — SIGNIFICANT CHANGE UP (ref 13–44)
MAGNESIUM SERPL-MCNC: 1.7 MG/DL — SIGNIFICANT CHANGE UP (ref 1.6–2.6)
MCHC RBC-ENTMCNC: 26.7 PG — LOW (ref 27–34)
MCHC RBC-ENTMCNC: 32.7 G/DL — SIGNIFICANT CHANGE UP (ref 32–36)
MCV RBC AUTO: 81.7 FL — SIGNIFICANT CHANGE UP (ref 80–100)
MONOCYTES # BLD AUTO: 0.69 K/UL — SIGNIFICANT CHANGE UP (ref 0–0.9)
MONOCYTES NFR BLD AUTO: 8.7 % — SIGNIFICANT CHANGE UP (ref 2–14)
NEUTROPHILS # BLD AUTO: 4.29 K/UL — SIGNIFICANT CHANGE UP (ref 1.8–7.4)
NEUTROPHILS NFR BLD AUTO: 53.8 % — SIGNIFICANT CHANGE UP (ref 43–77)
NRBC # BLD AUTO: 0 K/UL — SIGNIFICANT CHANGE UP (ref 0–0)
NRBC # FLD: 0 K/UL — SIGNIFICANT CHANGE UP (ref 0–0)
NRBC BLD AUTO-RTO: 0 /100 WBCS — SIGNIFICANT CHANGE UP (ref 0–0)
NT-PROBNP SERPL-SCNC: 76 PG/ML — SIGNIFICANT CHANGE UP (ref 0–125)
PLATELET # BLD AUTO: 243 K/UL — SIGNIFICANT CHANGE UP (ref 150–400)
PMV BLD: 8.6 FL — SIGNIFICANT CHANGE UP (ref 7–13)
POTASSIUM SERPL-MCNC: 3.9 MMOL/L — SIGNIFICANT CHANGE UP (ref 3.5–5.3)
POTASSIUM SERPL-SCNC: 3.9 MMOL/L — SIGNIFICANT CHANGE UP (ref 3.5–5.3)
PROT SERPL-MCNC: 7.2 GM/DL — SIGNIFICANT CHANGE UP (ref 6–8.3)
RBC # BLD: 4.8 M/UL — SIGNIFICANT CHANGE UP (ref 3.8–5.2)
RBC # FLD: 15.2 % — HIGH (ref 10.3–14.5)
RSV RNA NPH QL NAA+NON-PROBE: SIGNIFICANT CHANGE UP
SARS-COV-2 RNA SPEC QL NAA+PROBE: SIGNIFICANT CHANGE UP
SODIUM SERPL-SCNC: 138 MMOL/L — SIGNIFICANT CHANGE UP (ref 135–145)
TROPONIN I, HIGH SENSITIVITY RESULT: 4.7 NG/L — SIGNIFICANT CHANGE UP
TSH SERPL-MCNC: <0.01 UU/ML — LOW (ref 0.34–4.82)
WBC # BLD: 7.97 K/UL — SIGNIFICANT CHANGE UP (ref 3.8–10.5)
WBC # FLD AUTO: 7.97 K/UL — SIGNIFICANT CHANGE UP (ref 3.8–10.5)

## 2025-03-04 PROCEDURE — 93010 ELECTROCARDIOGRAM REPORT: CPT

## 2025-03-04 PROCEDURE — 36000 PLACE NEEDLE IN VEIN: CPT | Mod: XU

## 2025-03-04 PROCEDURE — 36415 COLL VENOUS BLD VENIPUNCTURE: CPT

## 2025-03-04 PROCEDURE — 71275 CT ANGIOGRAPHY CHEST: CPT | Mod: 26

## 2025-03-04 PROCEDURE — 80053 COMPREHEN METABOLIC PANEL: CPT

## 2025-03-04 PROCEDURE — 83690 ASSAY OF LIPASE: CPT

## 2025-03-04 PROCEDURE — 71275 CT ANGIOGRAPHY CHEST: CPT | Mod: MC

## 2025-03-04 PROCEDURE — 84443 ASSAY THYROID STIM HORMONE: CPT

## 2025-03-04 PROCEDURE — 84702 CHORIONIC GONADOTROPIN TEST: CPT

## 2025-03-04 PROCEDURE — 85025 COMPLETE CBC W/AUTO DIFF WBC: CPT

## 2025-03-04 PROCEDURE — 0241U: CPT

## 2025-03-04 PROCEDURE — 99285 EMERGENCY DEPT VISIT HI MDM: CPT | Mod: 25

## 2025-03-04 PROCEDURE — 83880 ASSAY OF NATRIURETIC PEPTIDE: CPT

## 2025-03-04 PROCEDURE — 83735 ASSAY OF MAGNESIUM: CPT

## 2025-03-04 PROCEDURE — 93005 ELECTROCARDIOGRAM TRACING: CPT

## 2025-03-04 PROCEDURE — 99285 EMERGENCY DEPT VISIT HI MDM: CPT

## 2025-03-04 PROCEDURE — 84484 ASSAY OF TROPONIN QUANT: CPT

## 2025-03-04 RX ORDER — LIDOCAINE HYDROCHLORIDE 20 MG/ML
1 JELLY TOPICAL ONCE
Refills: 0 | Status: COMPLETED | OUTPATIENT
Start: 2025-03-04 | End: 2025-03-04

## 2025-03-04 RX ORDER — ACETAMINOPHEN 500 MG/5ML
975 LIQUID (ML) ORAL ONCE
Refills: 0 | Status: COMPLETED | OUTPATIENT
Start: 2025-03-04 | End: 2025-03-04

## 2025-03-04 RX ORDER — IBUPROFEN 200 MG
600 TABLET ORAL ONCE
Refills: 0 | Status: COMPLETED | OUTPATIENT
Start: 2025-03-04 | End: 2025-03-04

## 2025-03-04 NOTE — ED ADULT NURSE REASSESSMENT NOTE - NS ED NURSE REASSESS COMMENT FT1
Pt removing IV at this time. Pt educated the importance of the IV for any medication. Pt states "take IV out I don't need it". IV removed, MD Fuller made aware.

## 2025-03-04 NOTE — ED ADULT NURSE NOTE - NSFALLUNIVINTERV_ED_ALL_ED
Bed/Stretcher in lowest position, wheels locked, appropriate side rails in place/Call bell, personal items and telephone in reach/Instruct patient to call for assistance before getting out of bed/chair/stretcher/Non-slip footwear applied when patient is off stretcher/[place] to call system/Physically safe environment - no spills, clutter or unnecessary equipment/Purposeful proactive rounding/Room/bathroom lighting operational, light cord in reach

## 2025-03-04 NOTE — ED ADULT NURSE REASSESSMENT NOTE - NS ED NURSE REASSESS COMMENT FT1
pt anxious about EKg leads and states "she doesn't want to be wired." Educated on her HR being elevated and need for monitoring, pt very anxious and states "just so you know i'll tear them off". MD aware

## 2025-03-04 NOTE — ED PROVIDER NOTE - OBJECTIVE STATEMENT
40 y/o female w/ PMHx of IDDM, hyperthyroidism, obesity, asthma, fibromyalgia, PCOS, and GERD  presenting to the ED sent by PCP for pleuritic upper back pain and SOB with increased pain upon inspiration. Pt reports that she woke up with the back pain this morning. Pt mentions that her back hurts to the touch, and that she is unable to lie on her back or side due to the pain. Pt denies abdominal pain, urinary symptoms, and fevers. Pt has been taking Advil (can only take in small doses due to PMHx) and Tylenol for her pain with little relief. Pt saw her PCP earlier today for her pain and was to the ED for further workup.

## 2025-03-04 NOTE — ED PROVIDER NOTE - PROGRESS NOTE DETAILS
Patient's labs show hyperthyroidism but otherwise nonactionable, CT is negative.  Patient is well-known to have hyperthyroidism but is otherwise asymptomatic and follows with endocrinology.  Patient would like to be discharged.  Patient tachycardic likely secondary to hyperthyroidism, no concern for sepsis or any other cause of tachycardia.

## 2025-03-04 NOTE — ED PROVIDER NOTE - CLINICAL SUMMARY MEDICAL DECISION MAKING FREE TEXT BOX
38 y/o female w/ PMHx of IDDM, hyperthyroidism, obesity, asthma, fibromyalgia, PCOS, and GERD  presenting to the ED sent by PCP for pleuritic upper back pain and SOB with increased pain upon inspiration. Pt reports that she woke up with the back pain this morning. Pt mentions that her back hurts to the touch, and that she is unable to lie on her back or side due to the pain.   Patient with right upper back tenderness, tachycardic this worse with deep breath.  Differential includes not limited to musculoskeletal pain, possible early shingles but no rash, PE, tachycardia secondary to hyperthyroidism which patient is well-established.  Plan for labs, EKG, CTA chest.  Reassess

## 2025-03-04 NOTE — ED ADULT TRIAGE NOTE - CHIEF COMPLAINT QUOTE
Patient sent to the ER by Dr. Sepulveda to rule out PE due to right back pain and shortness of breath. Patient states chest pain resolved, 02 sat 91% room air in triage and HR 37, denies dizziness. Sent for EKG.   History of asthma-non compliant with medications as she states it makes her blood sugar higher. Ambulates with walker.

## 2025-03-04 NOTE — ED PROVIDER NOTE - PATIENT PORTAL LINK FT
You can access the FollowMyHealth Patient Portal offered by [facility] by registering at the following website: [email or web address]. By joining [employer]’s FollowMyHealth portal, you will also be able to view your health information using other applications (apps) compatible with our system.

## 2025-03-04 NOTE — ED PROVIDER NOTE - PHYSICAL EXAMINATION
GEN: Patient awake alert NAD.   HEENT: normocephalic, atraumatic, EOMI, no scleral icterus, moist MM  CARDIAC: tachycardia, S1, S2, no murmur.   PULM: CTA B/L no wheeze, rhonchi, rales.   ABD: soft NT, ND, no rebound no guarding, no CVA tenderness.   MSK: Moving all extremities, no edema. 5/5 strength and full ROM in all extremities.  R upper back ttp   NEURO: A&Ox3, gait normal, no focal neurological deficits  SKIN: warm, dry, no rash.

## 2025-03-04 NOTE — ED ADULT NURSE NOTE - OBJECTIVE STATEMENT
pt presenting for right flank/upper back pain. Pt states its sharp in nature, worse with deep breaths. Pt has extensive HX and states she always runs tachycardic b/c of her thyroid issues.

## 2025-03-04 NOTE — ED PROVIDER NOTE - NSFOLLOWUPINSTRUCTIONS_ED_ALL_ED_FT
please follow-up with your endocrinologist.    Please return to the ER if develop any new or worsening symptoms.

## 2025-03-10 ENCOUNTER — EMERGENCY (EMERGENCY)
Facility: HOSPITAL | Age: 40
LOS: 0 days | Discharge: HOME | End: 2025-03-10
Attending: EMERGENCY MEDICINE
Payer: MEDICAID

## 2025-03-10 VITALS
TEMPERATURE: 98 F | RESPIRATION RATE: 18 BRPM | HEART RATE: 108 BPM | OXYGEN SATURATION: 100 % | SYSTOLIC BLOOD PRESSURE: 153 MMHG | DIASTOLIC BLOOD PRESSURE: 93 MMHG

## 2025-03-10 VITALS — WEIGHT: 293 LBS

## 2025-03-10 DIAGNOSIS — K21.9 GASTRO-ESOPHAGEAL REFLUX DISEASE WITHOUT ESOPHAGITIS: ICD-10-CM

## 2025-03-10 DIAGNOSIS — R06.02 SHORTNESS OF BREATH: ICD-10-CM

## 2025-03-10 DIAGNOSIS — R07.9 CHEST PAIN, UNSPECIFIED: ICD-10-CM

## 2025-03-10 DIAGNOSIS — E10.9 TYPE 1 DIABETES MELLITUS WITHOUT COMPLICATIONS: ICD-10-CM

## 2025-03-10 DIAGNOSIS — R00.0 TACHYCARDIA, UNSPECIFIED: ICD-10-CM

## 2025-03-10 DIAGNOSIS — E05.00 THYROTOXICOSIS WITH DIFFUSE GOITER WITHOUT THYROTOXIC CRISIS OR STORM: ICD-10-CM

## 2025-03-10 DIAGNOSIS — M48.00 SPINAL STENOSIS, SITE UNSPECIFIED: ICD-10-CM

## 2025-03-10 DIAGNOSIS — Z91.018 ALLERGY TO OTHER FOODS: ICD-10-CM

## 2025-03-10 DIAGNOSIS — E05.90 THYROTOXICOSIS, UNSPECIFIED WITHOUT THYROTOXIC CRISIS OR STORM: ICD-10-CM

## 2025-03-10 DIAGNOSIS — Z88.1 ALLERGY STATUS TO OTHER ANTIBIOTIC AGENTS: ICD-10-CM

## 2025-03-10 DIAGNOSIS — M79.7 FIBROMYALGIA: ICD-10-CM

## 2025-03-10 DIAGNOSIS — Z90.49 ACQUIRED ABSENCE OF OTHER SPECIFIED PARTS OF DIGESTIVE TRACT: Chronic | ICD-10-CM

## 2025-03-10 PROCEDURE — 99284 EMERGENCY DEPT VISIT MOD MDM: CPT

## 2025-03-10 PROCEDURE — 93010 ELECTROCARDIOGRAM REPORT: CPT

## 2025-03-10 PROCEDURE — 93005 ELECTROCARDIOGRAM TRACING: CPT

## 2025-03-10 PROCEDURE — 99283 EMERGENCY DEPT VISIT LOW MDM: CPT | Mod: 25

## 2025-03-10 RX ORDER — OXYCODONE HYDROCHLORIDE AND ACETAMINOPHEN 10; 325 MG/1; MG/1
1 TABLET ORAL
Qty: 10 | Refills: 0
Start: 2025-03-10 | End: 2025-03-12

## 2025-03-10 NOTE — ED STATDOCS - PATIENT PORTAL LINK FT
You can access the FollowMyHealth Patient Portal offered by [facility] by registering at the following website: [email or web address]. By joining [employer]’s FollowMyHealth portal, you will also be able to view your health information using other applications (apps) compatible with our system.

## 2025-03-10 NOTE — ED ADULT NURSE NOTE - MODE OF DISCHARGE
Detail Level: Simple Additional Notes: Discussed ELOS laser treatments in detail and pricing provided today. Patient will schedule after her PDT treatments. Ambulatory with cane/crutches/walker

## 2025-03-10 NOTE — ED STATDOCS - CARE PLAN
Principal Discharge DX:	Back pain  Secondary Diagnosis:	Compression fracture of L1 vertebra  Secondary Diagnosis:	Sinus tachycardia  Secondary Diagnosis:	Hyperthyroidism   1

## 2025-03-10 NOTE — ED STATDOCS - PHYSICAL EXAMINATION
Constitutional: NAD AAOx3  Eyes: PERRLA EOMI  Head: Normocephalic atraumatic  Mouth: MMM  Cardiac: regular rate   Resp: Lungs CTAB  GI: Abd s/nt/nd  Neuro: CN2-12 intact  Skin: No visible rashes  MSK: tenderness to papulations along back 5/5 motor in all 4 extremities'

## 2025-03-10 NOTE — ED STATDOCS - NSFOLLOWUPINSTRUCTIONS_ED_ALL_ED_FT
VERTEBRAL COMPRESSION FRACTURE - General Information    Vertebral Compression Fracture    WHAT YOU NEED TO KNOW:    What is a vertebral compression fracture? A vertebral compression fracture (VCF) is a collapse or breakdown in a bone in your spine. Compression fractures happen when there is too much pressure on the vertebra. VCFs most often occur in the thoracic (middle) and lumbar (lower) areas of your spine. Fractures may be mild to severe.  Compression Fracture of Spine    What are the signs and symptoms of a VCF? You may not have any signs and symptoms with a mild VCF. You may have any of the following with a more severe fracture:    Sudden, severe, and sharp back pain    Back pain that gets worse when you stand or walk    Muscle spasms in your back    Problems urinating or having bowel movements    Sudden weakness in your arms or legs  How is a VCF diagnosed? Your healthcare provider will ask you about injuries and diseases you have had in the past. Your healthcare provider will do a physical exam, and check your spine. You may need any of the following tests:    X-rays, a CT scan, or MRI of your spine may be taken. You may be given contrast liquid before the pictures are taken to help a fracture show up better in pictures. Tell the healthcare provider if you have ever had an allergic reaction to contrast liquid. Do not enter the MRI room with anything metal. Metal can cause serious injury. Tell the provider if you have any metal in or on your body.    A bone scan may show broken bones or other problems in the spine, such as an infection.  How is a VCF treated? You may need any of the following, depending on how severe the fracture is:    Bed rest may be needed for a mild fracture.    A back brace may be needed for 8 to 12 weeks. A brace may decrease your pain, and help your vertebrae heal.    A cane or walker can help you keep your balance when you walk. This helps prevent a fall that could cause more injury.    Medicines may be given for pain. Bisphosphonates and calcitonin are medicines to help your bones get stronger. They can decrease the pain of a VCF caused by osteoporosis, and decrease your risk for another fracture.    Physical or occupational therapy may be recommended. A physical therapist teaches you exercises to help improve movement and strength, and to decrease pain. An occupational therapist teaches you skills to help with your daily activities.    Surgery may be needed if your pain, weakness, or numbness does not go away with other treatment. Surgery may make your spine more stable, and help decrease pressure on your spinal nerves caused by the fracture.  How can I manage pain while I sleep?    Do not sleep in a waterbed. Waterbeds do not provide good back support.    Sleep on a firm mattress. You may also put a ½ to 1-inch piece of plywood between the mattress and box spring.    Sleep on your back with a pillow under your knees. This will decrease pressure on your back. You may also sleep on your side with 1 or both of your knees bent and a pillow between them. It may also be helpful to sleep on your stomach with a pillow under you at waist level.  Call your local emergency number (911 in the [place]) if:    You feel lightheaded, short of breath, and have chest pain.    You cough up blood.    You suddenly cannot feel your legs.    You suddenly have trouble moving your arms or legs.  When should I seek immediate care?    Your arm or leg feels warm, tender, and painful. It may look swollen and red.    You have new problems urinating or having bowel movements.    You have severe pain in your back after falling, bending forward, sneezing, or coughing strongly.  When should I call my doctor?    You cannot sleep or rest because of back pain.    You have pain or swelling in your back that is getting worse, or does not go away.    You have questions or concerns about your condition or care.  CARE AGREEMENT:    You have the right to help plan your care. Learn about your health condition and how it may be treated. Discuss treatment options with your healthcare providers to decide what care you want to receive. You always have the right to refuse treatment.    © [employer] 1973, 2025    	  back to top            © [employer] 1973, 2025

## 2025-03-10 NOTE — ED STATDOCS - OBJECTIVE STATEMENT
38 y/o female with a PMHx of GERD, Fibromyalgia, DM (on Insulin), Graves, spinal stenosis, L1 fx since July and primary hyperthyroidism presents to the ED c/o chest pain, SOB and back pain since two weeks ago. Pt states she was seen here Tuesday for same symptoms which they did a CAT scan and did not find anything. Pt states she taken oxycodone in the past that helped with her pain. Pt states it hurts when being touched in the middle of her back. Pt denies any other symptoms at this time.

## 2025-03-10 NOTE — ED ADULT TRIAGE NOTE - CHIEF COMPLAINT QUOTE
Pt presents to ED AOX3 c/o chest pain, back pain and sob since last friday. Pt states she was seen here Tuesday for same s/s w/ no change. PMhx DM, Graves, spinal stenosis, and L1 fx from July. Pt taken for EKG.

## 2025-03-10 NOTE — ED ADULT NURSE NOTE - NSFALLRISKFACTORS_ED_ALL_ED
spinal stenosis/Bone Condition: Including osteoporosis, prolonged steroid use or metastatic bone disease/cancer

## 2025-03-10 NOTE — ED ADULT NURSE NOTE - NSFALLRISKINTERV_ED_ALL_ED

## 2025-03-10 NOTE — ED ADULT NURSE NOTE - OBJECTIVE STATEMENT
Back Pain radiates around into  right rib cage.  Patient states she has also been having some chest pain below right breast and shortness of breath related to the pain.    hx Type 1 Diabetes, Thyroid, cholecystectomy, L1 back fx disc degeneration and spinal stenosis

## 2025-03-18 ENCOUNTER — RX RENEWAL (OUTPATIENT)
Age: 40
End: 2025-03-18

## 2025-03-24 ENCOUNTER — APPOINTMENT (OUTPATIENT)
Dept: GASTROENTEROLOGY | Facility: CLINIC | Age: 40
End: 2025-03-24

## 2025-04-07 ENCOUNTER — APPOINTMENT (OUTPATIENT)
Dept: GASTROENTEROLOGY | Facility: CLINIC | Age: 40
End: 2025-04-07

## 2025-04-07 VITALS
DIASTOLIC BLOOD PRESSURE: 86 MMHG | HEIGHT: 64 IN | WEIGHT: 280 LBS | BODY MASS INDEX: 47.8 KG/M2 | SYSTOLIC BLOOD PRESSURE: 134 MMHG

## 2025-04-07 DIAGNOSIS — R13.10 DYSPHAGIA, UNSPECIFIED: ICD-10-CM

## 2025-04-07 DIAGNOSIS — K59.00 CONSTIPATION, UNSPECIFIED: ICD-10-CM

## 2025-04-07 DIAGNOSIS — Z76.0 ENCOUNTER FOR ISSUE OF REPEAT PRESCRIPTION: ICD-10-CM

## 2025-04-07 DIAGNOSIS — R12 HEARTBURN: ICD-10-CM

## 2025-04-07 PROCEDURE — 99214 OFFICE O/P EST MOD 30 MIN: CPT

## 2025-04-07 RX ORDER — INSULIN LISPRO 100 [IU]/ML
100 INJECTION, SOLUTION INTRAVENOUS; SUBCUTANEOUS
Refills: 0 | Status: ACTIVE | COMMUNITY

## 2025-05-01 ENCOUNTER — RX RENEWAL (OUTPATIENT)
Age: 40
End: 2025-05-01

## 2025-05-20 ENCOUNTER — APPOINTMENT (OUTPATIENT)
Dept: GASTROENTEROLOGY | Facility: CLINIC | Age: 40
End: 2025-05-20

## 2025-07-17 NOTE — PATIENT PROFILE ADULT - LIVING ENVIRONMENT
You will be scheduled for a stress echo after your appointment today.  Please wear comfortable clothing (shorts or pants with a shirt or blouse) and walking/athletic shoes.  Do not eat or drink anything, except water, for at least 2 hours prior to your test.  Do take your scheduled medications prior to your test.    
no

## 2025-08-07 ENCOUNTER — RX RENEWAL (OUTPATIENT)
Age: 40
End: 2025-08-07

## 2025-08-25 ENCOUNTER — APPOINTMENT (OUTPATIENT)
Dept: GASTROENTEROLOGY | Facility: CLINIC | Age: 40
End: 2025-08-25